# Patient Record
Sex: FEMALE | Race: BLACK OR AFRICAN AMERICAN | NOT HISPANIC OR LATINO | Employment: FULL TIME | URBAN - METROPOLITAN AREA
[De-identification: names, ages, dates, MRNs, and addresses within clinical notes are randomized per-mention and may not be internally consistent; named-entity substitution may affect disease eponyms.]

---

## 2017-06-21 ENCOUNTER — HOSPITAL ENCOUNTER (OUTPATIENT)
Dept: RADIOLOGY | Facility: OTHER | Age: 47
Discharge: HOME | End: 2017-06-21
Attending: SPECIALIST
Payer: COMMERCIAL

## 2017-06-21 VITALS — HEIGHT: 64 IN | WEIGHT: 160 LBS | BODY MASS INDEX: 27.31 KG/M2

## 2017-06-21 DIAGNOSIS — Z12.31 VISIT FOR SCREENING MAMMOGRAM: ICD-10-CM

## 2017-06-21 PROCEDURE — 77067 SCR MAMMO BI INCL CAD: CPT | Mod: TC

## 2017-06-21 PROCEDURE — 77063 BREAST TOMOSYNTHESIS BI: CPT | Mod: 26,,, | Performed by: RADIOLOGY

## 2017-06-21 PROCEDURE — 77067 SCR MAMMO BI INCL CAD: CPT | Mod: 26,,, | Performed by: RADIOLOGY

## 2017-06-26 ENCOUNTER — OFFICE VISIT (OUTPATIENT)
Dept: INTERNAL MEDICINE | Facility: CLINIC | Age: 47
End: 2017-06-26
Payer: COMMERCIAL

## 2017-06-26 ENCOUNTER — LAB VISIT (OUTPATIENT)
Dept: LAB | Facility: OTHER | Age: 47
End: 2017-06-26
Attending: INTERNAL MEDICINE
Payer: COMMERCIAL

## 2017-06-26 VITALS
HEART RATE: 98 BPM | BODY MASS INDEX: 27.82 KG/M2 | SYSTOLIC BLOOD PRESSURE: 110 MMHG | HEIGHT: 64 IN | DIASTOLIC BLOOD PRESSURE: 80 MMHG | WEIGHT: 162.94 LBS

## 2017-06-26 DIAGNOSIS — Z00.00 WELLNESS EXAMINATION: Primary | ICD-10-CM

## 2017-06-26 DIAGNOSIS — Z00.00 WELLNESS EXAMINATION: ICD-10-CM

## 2017-06-26 DIAGNOSIS — F34.1 DYSTHYMIA: ICD-10-CM

## 2017-06-26 LAB
ALBUMIN SERPL BCP-MCNC: 3.9 G/DL
ALP SERPL-CCNC: 55 U/L
ALT SERPL W/O P-5'-P-CCNC: 12 U/L
ANION GAP SERPL CALC-SCNC: 10 MMOL/L
AST SERPL-CCNC: 14 U/L
BASOPHILS # BLD AUTO: 0.06 K/UL
BASOPHILS NFR BLD: 0.9 %
BILIRUB SERPL-MCNC: 0.4 MG/DL
BUN SERPL-MCNC: 10 MG/DL
CALCIUM SERPL-MCNC: 9 MG/DL
CHLORIDE SERPL-SCNC: 105 MMOL/L
CHOLEST/HDLC SERPL: 3.3 {RATIO}
CO2 SERPL-SCNC: 24 MMOL/L
CREAT SERPL-MCNC: 0.7 MG/DL
DIFFERENTIAL METHOD: ABNORMAL
EOSINOPHIL # BLD AUTO: 0.3 K/UL
EOSINOPHIL NFR BLD: 4.2 %
ERYTHROCYTE [DISTWIDTH] IN BLOOD BY AUTOMATED COUNT: 12.4 %
EST. GFR  (AFRICAN AMERICAN): >60 ML/MIN/1.73 M^2
EST. GFR  (NON AFRICAN AMERICAN): >60 ML/MIN/1.73 M^2
GLUCOSE SERPL-MCNC: 76 MG/DL
HCT VFR BLD AUTO: 34.7 %
HDL/CHOLESTEROL RATIO: 30.3 %
HDLC SERPL-MCNC: 155 MG/DL
HDLC SERPL-MCNC: 47 MG/DL
HGB BLD-MCNC: 11.5 G/DL
LDLC SERPL CALC-MCNC: 93.8 MG/DL
LYMPHOCYTES # BLD AUTO: 1.8 K/UL
LYMPHOCYTES NFR BLD: 25.8 %
MCH RBC QN AUTO: 31.2 PG
MCHC RBC AUTO-ENTMCNC: 33.1 %
MCV RBC AUTO: 94 FL
MONOCYTES # BLD AUTO: 0.6 K/UL
MONOCYTES NFR BLD: 8.5 %
NEUTROPHILS # BLD AUTO: 4.2 K/UL
NEUTROPHILS NFR BLD: 60.3 %
NONHDLC SERPL-MCNC: 108 MG/DL
PLATELET # BLD AUTO: 253 K/UL
PMV BLD AUTO: 10.2 FL
POTASSIUM SERPL-SCNC: 3.5 MMOL/L
PROT SERPL-MCNC: 7.6 G/DL
RBC # BLD AUTO: 3.69 M/UL
SODIUM SERPL-SCNC: 139 MMOL/L
T4 FREE SERPL-MCNC: 0.94 NG/DL
TRIGL SERPL-MCNC: 71 MG/DL
TSH SERPL DL<=0.005 MIU/L-ACNC: 0.29 UIU/ML
WBC # BLD AUTO: 6.98 K/UL

## 2017-06-26 PROCEDURE — 80061 LIPID PANEL: CPT

## 2017-06-26 PROCEDURE — 84443 ASSAY THYROID STIM HORMONE: CPT

## 2017-06-26 PROCEDURE — 99396 PREV VISIT EST AGE 40-64: CPT | Mod: S$GLB,,, | Performed by: INTERNAL MEDICINE

## 2017-06-26 PROCEDURE — 36415 COLL VENOUS BLD VENIPUNCTURE: CPT

## 2017-06-26 PROCEDURE — 99999 PR PBB SHADOW E&M-EST. PATIENT-LVL III: CPT | Mod: PBBFAC,,, | Performed by: INTERNAL MEDICINE

## 2017-06-26 PROCEDURE — 84439 ASSAY OF FREE THYROXINE: CPT

## 2017-06-26 PROCEDURE — 83036 HEMOGLOBIN GLYCOSYLATED A1C: CPT

## 2017-06-26 PROCEDURE — 80053 COMPREHEN METABOLIC PANEL: CPT

## 2017-06-26 PROCEDURE — 85025 COMPLETE CBC W/AUTO DIFF WBC: CPT

## 2017-06-26 RX ORDER — FLUTICASONE PROPIONATE 50 MCG
SPRAY, SUSPENSION (ML) NASAL
Qty: 16 G | Refills: 3 | Status: SHIPPED | OUTPATIENT
Start: 2017-06-26 | End: 2021-08-23 | Stop reason: ALTCHOICE

## 2017-06-26 RX ORDER — METRONIDAZOLE 500 MG/1
TABLET ORAL
COMMUNITY
Start: 2017-06-21 | End: 2017-06-26

## 2017-06-26 RX ORDER — OXYBUTYNIN CHLORIDE 10 MG/1
10 TABLET, EXTENDED RELEASE ORAL DAILY
Qty: 30 TABLET | Refills: 3
Start: 2017-06-26 | End: 2018-08-20

## 2017-06-26 RX ORDER — AMOXICILLIN AND CLAVULANATE POTASSIUM 875; 125 MG/1; MG/1
TABLET, FILM COATED ORAL
COMMUNITY
Start: 2017-06-21 | End: 2017-06-26

## 2017-06-26 RX ORDER — OXYBUTYNIN CHLORIDE 10 MG/1
TABLET, EXTENDED RELEASE ORAL
COMMUNITY
Start: 2017-06-21 | End: 2017-06-26

## 2017-06-26 NOTE — PROGRESS NOTES
Subjective:       Patient ID: Eva Hair is a 47 y.o. female.    Chief Complaint: Annual Exam and Blood Sugar Problem (family hx)    Pt here for annual exam. Feels well currently and has no c/o except that she'd like to see a counselor. Not depressed or anxious but wants to talk to someone about some life events. No SI/HI.     Counseled on exercise goals. She is up to date WWE with GYN. Mammogram is up to date.       Review of Systems   Constitutional: Negative for unexpected weight change.   Eyes: Negative for visual disturbance.   Respiratory: Negative for shortness of breath.    Cardiovascular: Negative for palpitations and leg swelling.   Gastrointestinal: Negative for blood in stool, constipation and diarrhea.   Genitourinary: Negative for dysuria.   Neurological: Negative for dizziness and syncope.   Hematological: Negative for adenopathy.   Psychiatric/Behavioral: Negative for dysphoric mood.       Objective:      Physical Exam   Constitutional: She is oriented to person, place, and time. She appears well-developed and well-nourished.   HENT:   Head: Normocephalic.   Right Ear: Tympanic membrane, external ear and ear canal normal.   Left Ear: Tympanic membrane, external ear and ear canal normal.   Nose: Nose normal.   Mouth/Throat: Uvula is midline and oropharynx is clear and moist.   Eyes: Conjunctivae, EOM and lids are normal. Pupils are equal, round, and reactive to light. Right conjunctiva is not injected. Left conjunctiva is not injected.   Neck: Neck supple. No JVD present. Carotid bruit is not present. No thyroid mass and no thyromegaly present.   Cardiovascular: Normal rate, regular rhythm, S1 normal, S2 normal and intact distal pulses.  PMI is not displaced.    No murmur heard.  Pulses:       Posterior tibial pulses are 2+ on the right side, and 2+ on the left side.   Pulmonary/Chest: Effort normal and breath sounds normal. She has no wheezes. She has no rhonchi. She has no rales.   Abdominal:  Soft. Bowel sounds are normal. She exhibits no distension and no abdominal bruit. There is no hepatosplenomegaly. There is no tenderness.   Musculoskeletal: She exhibits no edema.   Lymphadenopathy:        Head (right side): No preauricular and no posterior auricular adenopathy present.        Head (left side): No preauricular and no posterior auricular adenopathy present.     She has no cervical adenopathy.     She has no axillary adenopathy.   Neurological: She is alert and oriented to person, place, and time. She has normal strength. No cranial nerve deficit or sensory deficit. Gait normal.   Reflex Scores:       Patellar reflexes are 2+ on the right side and 2+ on the left side.       Achilles reflexes are 2+ on the right side and 2+ on the left side.  Skin: Skin is warm. No rash noted.   Psychiatric: She has a normal mood and affect. Her speech is normal and behavior is normal. Judgment and thought content normal.       Assessment:       1. Wellness examination    2. Dysthymia        Plan:       1. Appropriate labs  2. Psychology referral

## 2017-06-27 LAB
ESTIMATED AVG GLUCOSE: 100 MG/DL
HBA1C MFR BLD HPLC: 5.1 %

## 2018-07-11 ENCOUNTER — HOSPITAL ENCOUNTER (OUTPATIENT)
Dept: RADIOLOGY | Facility: OTHER | Age: 48
Discharge: HOME | End: 2018-07-11
Attending: SPECIALIST
Payer: COMMERCIAL

## 2018-07-11 VITALS — BODY MASS INDEX: 27.66 KG/M2 | WEIGHT: 162 LBS | HEIGHT: 64 IN

## 2018-07-11 DIAGNOSIS — Z12.31 VISIT FOR SCREENING MAMMOGRAM: ICD-10-CM

## 2018-07-11 PROCEDURE — 77063 BREAST TOMOSYNTHESIS BI: CPT | Mod: 26,,, | Performed by: INTERNAL MEDICINE

## 2018-07-11 PROCEDURE — 77067 SCR MAMMO BI INCL CAD: CPT | Mod: TC

## 2018-07-11 PROCEDURE — 77067 SCR MAMMO BI INCL CAD: CPT | Mod: 26,,, | Performed by: INTERNAL MEDICINE

## 2018-08-20 ENCOUNTER — LAB VISIT (OUTPATIENT)
Dept: LAB | Facility: HOSPITAL | Age: 48
End: 2018-08-20
Attending: INTERNAL MEDICINE
Payer: COMMERCIAL

## 2018-08-20 ENCOUNTER — OFFICE VISIT (OUTPATIENT)
Dept: FAMILY MEDICINE | Facility: CLINIC | Age: 48
End: 2018-08-20
Payer: COMMERCIAL

## 2018-08-20 VITALS
HEIGHT: 65 IN | TEMPERATURE: 98 F | BODY MASS INDEX: 27.44 KG/M2 | HEART RATE: 95 BPM | DIASTOLIC BLOOD PRESSURE: 82 MMHG | OXYGEN SATURATION: 97 % | SYSTOLIC BLOOD PRESSURE: 122 MMHG | WEIGHT: 164.69 LBS

## 2018-08-20 DIAGNOSIS — Z00.00 ANNUAL PHYSICAL EXAM: ICD-10-CM

## 2018-08-20 DIAGNOSIS — L30.9 ECZEMA, UNSPECIFIED TYPE: ICD-10-CM

## 2018-08-20 DIAGNOSIS — F32.A DEPRESSION, UNSPECIFIED DEPRESSION TYPE: ICD-10-CM

## 2018-08-20 DIAGNOSIS — J45.30 MILD PERSISTENT ASTHMA WITHOUT COMPLICATION: ICD-10-CM

## 2018-08-20 DIAGNOSIS — R32 URINARY INCONTINENCE, UNSPECIFIED TYPE: ICD-10-CM

## 2018-08-20 DIAGNOSIS — Z00.00 ANNUAL PHYSICAL EXAM: Primary | ICD-10-CM

## 2018-08-20 DIAGNOSIS — J30.89 ENVIRONMENTAL AND SEASONAL ALLERGIES: ICD-10-CM

## 2018-08-20 LAB
ANION GAP SERPL CALC-SCNC: 11 MMOL/L
BASOPHILS # BLD AUTO: 0.11 K/UL
BASOPHILS NFR BLD: 1.7 %
BUN SERPL-MCNC: 17 MG/DL
CALCIUM SERPL-MCNC: 9.6 MG/DL
CHLORIDE SERPL-SCNC: 106 MMOL/L
CHOLEST SERPL-MCNC: 161 MG/DL
CHOLEST/HDLC SERPL: 3 {RATIO}
CO2 SERPL-SCNC: 21 MMOL/L
CREAT SERPL-MCNC: 0.8 MG/DL
DIFFERENTIAL METHOD: ABNORMAL
EOSINOPHIL # BLD AUTO: 0.4 K/UL
EOSINOPHIL NFR BLD: 5.9 %
ERYTHROCYTE [DISTWIDTH] IN BLOOD BY AUTOMATED COUNT: 12.7 %
EST. GFR  (AFRICAN AMERICAN): >60 ML/MIN/1.73 M^2
EST. GFR  (NON AFRICAN AMERICAN): >60 ML/MIN/1.73 M^2
ESTIMATED AVG GLUCOSE: 103 MG/DL
GLUCOSE SERPL-MCNC: 89 MG/DL
HBA1C MFR BLD HPLC: 5.2 %
HCT VFR BLD AUTO: 36.5 %
HDLC SERPL-MCNC: 53 MG/DL
HDLC SERPL: 32.9 %
HGB BLD-MCNC: 11.7 G/DL
IMM GRANULOCYTES # BLD AUTO: 0.02 K/UL
IMM GRANULOCYTES NFR BLD AUTO: 0.3 %
LDLC SERPL CALC-MCNC: 96.6 MG/DL
LYMPHOCYTES # BLD AUTO: 1.7 K/UL
LYMPHOCYTES NFR BLD: 25.9 %
MCH RBC QN AUTO: 31.3 PG
MCHC RBC AUTO-ENTMCNC: 32.1 G/DL
MCV RBC AUTO: 98 FL
MONOCYTES # BLD AUTO: 0.6 K/UL
MONOCYTES NFR BLD: 9.1 %
NEUTROPHILS # BLD AUTO: 3.7 K/UL
NEUTROPHILS NFR BLD: 57.1 %
NONHDLC SERPL-MCNC: 108 MG/DL
NRBC BLD-RTO: 0 /100 WBC
PLATELET # BLD AUTO: 240 K/UL
PMV BLD AUTO: 10.2 FL
POTASSIUM SERPL-SCNC: 4.6 MMOL/L
RBC # BLD AUTO: 3.74 M/UL
SODIUM SERPL-SCNC: 138 MMOL/L
TRIGL SERPL-MCNC: 57 MG/DL
TSH SERPL DL<=0.005 MIU/L-ACNC: 0.5 UIU/ML
WBC # BLD AUTO: 6.48 K/UL

## 2018-08-20 PROCEDURE — 80061 LIPID PANEL: CPT

## 2018-08-20 PROCEDURE — 85025 COMPLETE CBC W/AUTO DIFF WBC: CPT

## 2018-08-20 PROCEDURE — 36415 COLL VENOUS BLD VENIPUNCTURE: CPT | Mod: PO

## 2018-08-20 PROCEDURE — 99999 PR PBB SHADOW E&M-EST. PATIENT-LVL III: CPT | Mod: PBBFAC,,, | Performed by: INTERNAL MEDICINE

## 2018-08-20 PROCEDURE — 84443 ASSAY THYROID STIM HORMONE: CPT

## 2018-08-20 PROCEDURE — 80048 BASIC METABOLIC PNL TOTAL CA: CPT

## 2018-08-20 PROCEDURE — 99396 PREV VISIT EST AGE 40-64: CPT | Mod: S$GLB,,, | Performed by: INTERNAL MEDICINE

## 2018-08-20 PROCEDURE — 83036 HEMOGLOBIN GLYCOSYLATED A1C: CPT

## 2018-08-20 RX ORDER — LORATADINE 10 MG/1
10 TABLET ORAL DAILY
Refills: 4 | COMMUNITY
Start: 2018-06-05 | End: 2021-08-23 | Stop reason: ALTCHOICE

## 2018-08-20 RX ORDER — OXYBUTYNIN CHLORIDE 5 MG/1
5 TABLET, EXTENDED RELEASE ORAL DAILY
Qty: 90 TABLET | Refills: 3 | Status: SHIPPED | OUTPATIENT
Start: 2018-08-20 | End: 2023-07-31

## 2018-08-20 RX ORDER — OLOPATADINE HYDROCHLORIDE 1 MG/ML
SOLUTION/ DROPS OPHTHALMIC
Refills: 4 | COMMUNITY
Start: 2018-08-15

## 2018-08-20 RX ORDER — BUDESONIDE AND FORMOTEROL FUMARATE DIHYDRATE 160; 4.5 UG/1; UG/1
2 AEROSOL RESPIRATORY (INHALATION) DAILY
COMMUNITY
End: 2018-12-13 | Stop reason: SDUPTHER

## 2018-08-20 RX ORDER — MINERAL OIL
180 ENEMA (ML) RECTAL EVERY MORNING
Refills: 4 | COMMUNITY
Start: 2018-08-04 | End: 2020-07-13

## 2018-08-20 RX ORDER — AZELASTINE 1 MG/ML
1 SPRAY, METERED NASAL DAILY PRN
Refills: 4 | COMMUNITY
Start: 2018-08-15

## 2018-08-20 RX ORDER — ESCITALOPRAM OXALATE 5 MG/1
TABLET ORAL
Qty: 30 TABLET | Refills: 1 | Status: SHIPPED | OUTPATIENT
Start: 2018-08-20 | End: 2018-10-12 | Stop reason: SDUPTHER

## 2018-08-20 RX ORDER — ALBUTEROL SULFATE 90 UG/1
2 AEROSOL, METERED RESPIRATORY (INHALATION) DAILY
COMMUNITY

## 2018-08-20 NOTE — PROGRESS NOTES
"Subjective:        Patient ID: Eva Hair is a 48 y.o. female.    Chief Complaint: Annual Exam and Establish Care    HPI   Eva Hair presents for annual exam and to establish care.    Pt expresses concern that she is depressed.  She has been feeling this way for the past 1 year and it's gradually getting worse.  She c/o decreased interest, feeling irritable, depressed mood, difficulty sleeping.  She thinks it may be due to poor work/personal life balance.  It is getting to the point where it's interfering with her function and she's concerned it affecting the way people may perceive or interact with her, especially at work.    Pt was prescribed Lexapro in the past, ~[id number].  She took the medication for about 6 months.  She is concerned that she will become dependent on medication if she takes something for the depression.  She is also worried that she often has the SE of being drowsy from medication.    Review of Systems   Constitutional: Positive for fatigue and unexpected weight change.   HENT: Negative for hearing loss.         - itchy ears 2/2 allergies   Respiratory: Negative for shortness of breath.         - "heavy" breathing   Cardiovascular: Negative for chest pain and leg swelling.   Gastrointestinal: Negative for abdominal pain, constipation and diarrhea.   Genitourinary: Negative for difficulty urinating, vaginal bleeding and vaginal discharge.        - urinary incontinence   Musculoskeletal: Positive for arthralgias. Negative for back pain.   Allergic/Immunologic: Positive for environmental allergies.   Neurological: Negative for dizziness and headaches.   Hematological: Does not bruise/bleed easily.   Psychiatric/Behavioral: Positive for dysphoric mood and sleep disturbance. The patient is not nervous/anxious.            Objective:        Vitals:    08/20/18 0750   BP: 122/82   Pulse: 95   Temp: 98.3 °F (36.8 °C)     Physical Exam   Constitutional: She is oriented to person, place, and " time. She appears well-developed and well-nourished. She appears distressed (tearful).   HENT:   Head: Normocephalic and atraumatic.   Right Ear: External ear normal.   Left Ear: External ear normal.   Nose: Nose normal.   - bilateral ear canals clear, tympanic membranes visualized - normal color and light reflex   Eyes: Conjunctivae and EOM are normal.   Neck: Normal range of motion. Neck supple. No thyromegaly present.   Cardiovascular: Normal rate, regular rhythm and normal heart sounds.   No murmur heard.  Pulmonary/Chest: Effort normal and breath sounds normal. No respiratory distress.   Abdominal: Soft. She exhibits no distension and no mass. There is no tenderness. There is no guarding.   Musculoskeletal: She exhibits no edema.   Lymphadenopathy:     She has no cervical adenopathy.   Neurological: She is alert and oriented to person, place, and time.   Skin: Skin is warm and dry.   Psychiatric:   - affect appropriate   Vitals reviewed.          Assessment:         1. Annual physical exam    2. Mild persistent asthma without complication    3. Environmental and seasonal allergies    4. Eczema, unspecified type    5. Depression, unspecified depression type    6. Urinary incontinence, unspecified type              Plan:         Eva was seen today for annual exam and establish care.    Diagnoses and all orders for this visit:    Annual physical exam  - fasting labs today  - f/u gyn Dr. Rowland  -     CBC auto differential; Future  -     Basic metabolic panel; Future  -     Lipid panel; Future  -     Hemoglobin A1c; Future  -     TSH; Future    Mild persistent asthma without complication: no acute issues; Symbicort, Albuterol PRN    Environmental and seasonal allergies: Allegra, nasal sprays, Patanol gtt to ears as needed for itching    Eczema, unspecified type: Hydrocortisone cream PRN    Depression, unspecified depression type: Discussed medication options, possible SEs.  Recommend starting because sx are  affecting QoL and function and are getting worse.  Start Lexapro 2.5mg qhs; after 1 week increase to 5mg daily if tolerated.  Counseled pt this is a very low lose, need at least 1 month to see any effects, may need higher dose to see therapeutic effects.  -     escitalopram oxalate (LEXAPRO) 5 MG Tab; Take 1/2 tab by mouth daily for 1 week then increase to 1 tab daily.    Urinary incontinence, unspecified type: Decrease oxybutynin dose to 5mg qd.  Pt reports HAs when taking 10mg qd.  -     oxybutynin (DITROPAN-XL) 5 MG TR24; Take 1 tablet (5 mg total) by mouth once daily.        Follow-up in about 1 month (around 9/20/2018) for depressed mood.

## 2018-08-28 ENCOUNTER — TELEPHONE (OUTPATIENT)
Dept: FAMILY MEDICINE | Facility: CLINIC | Age: 48
End: 2018-08-28

## 2018-08-28 NOTE — TELEPHONE ENCOUNTER
Notified patient that there is an unread message in [facility] from Dr. Trujillo. Read message. Patient verbalizes understanding.She will go to [facility] to ask any questions.

## 2018-10-12 DIAGNOSIS — F32.A DEPRESSION, UNSPECIFIED DEPRESSION TYPE: ICD-10-CM

## 2018-10-12 RX ORDER — ESCITALOPRAM OXALATE 5 MG/1
TABLET ORAL
Qty: 30 TABLET | Refills: 1 | Status: SHIPPED | OUTPATIENT
Start: 2018-10-12 | End: 2018-12-10 | Stop reason: SDUPTHER

## 2018-12-04 DIAGNOSIS — F32.A DEPRESSION, UNSPECIFIED DEPRESSION TYPE: ICD-10-CM

## 2018-12-04 RX ORDER — ESCITALOPRAM OXALATE 5 MG/1
TABLET ORAL
Qty: 30 TABLET | Refills: 0 | Status: CANCELLED | OUTPATIENT
Start: 2018-12-04

## 2018-12-10 DIAGNOSIS — F32.A DEPRESSION, UNSPECIFIED DEPRESSION TYPE: ICD-10-CM

## 2018-12-10 RX ORDER — ESCITALOPRAM OXALATE 5 MG/1
5 TABLET ORAL DAILY
Qty: 30 TABLET | Refills: 1 | Status: SHIPPED | OUTPATIENT
Start: 2018-12-10 | End: 2018-12-13 | Stop reason: SINTOL

## 2018-12-10 NOTE — TELEPHONE ENCOUNTER
Called patient in regards to unread [facility] message. Read her the message and she states that she is taking the whole lexapro 5mg and would like that refilled. She also requested an appointment with Dr. Trujillo. Appointment scheduled.

## 2018-12-13 ENCOUNTER — OFFICE VISIT (OUTPATIENT)
Dept: FAMILY MEDICINE | Facility: CLINIC | Age: 48
End: 2018-12-13
Payer: COMMERCIAL

## 2018-12-13 VITALS
DIASTOLIC BLOOD PRESSURE: 100 MMHG | WEIGHT: 165.56 LBS | HEIGHT: 64 IN | RESPIRATION RATE: 20 BRPM | SYSTOLIC BLOOD PRESSURE: 120 MMHG | TEMPERATURE: 98 F | BODY MASS INDEX: 28.27 KG/M2

## 2018-12-13 DIAGNOSIS — J30.89 ENVIRONMENTAL AND SEASONAL ALLERGIES: ICD-10-CM

## 2018-12-13 DIAGNOSIS — F32.A DEPRESSION, UNSPECIFIED DEPRESSION TYPE: Primary | ICD-10-CM

## 2018-12-13 DIAGNOSIS — J45.30 MILD PERSISTENT ASTHMA WITHOUT COMPLICATION: ICD-10-CM

## 2018-12-13 PROCEDURE — 99214 OFFICE O/P EST MOD 30 MIN: CPT | Mod: S$GLB,,, | Performed by: INTERNAL MEDICINE

## 2018-12-13 PROCEDURE — 3008F BODY MASS INDEX DOCD: CPT | Mod: CPTII,S$GLB,, | Performed by: INTERNAL MEDICINE

## 2018-12-13 PROCEDURE — 99999 PR PBB SHADOW E&M-EST. PATIENT-LVL III: CPT | Mod: PBBFAC,,, | Performed by: INTERNAL MEDICINE

## 2018-12-13 RX ORDER — BUDESONIDE AND FORMOTEROL FUMARATE DIHYDRATE 160; 4.5 UG/1; UG/1
2 AEROSOL RESPIRATORY (INHALATION) DAILY
Qty: 10.2 G | Refills: 5 | Status: SHIPPED | OUTPATIENT
Start: 2018-12-13 | End: 2019-12-13

## 2018-12-13 RX ORDER — MONTELUKAST SODIUM 10 MG/1
10 TABLET ORAL DAILY
Refills: 4 | COMMUNITY
Start: 2018-11-08 | End: 2023-07-21

## 2018-12-13 RX ORDER — BUPROPION HYDROCHLORIDE 150 MG/1
150 TABLET ORAL DAILY
Qty: 30 TABLET | Refills: 2 | Status: SHIPPED | OUTPATIENT
Start: 2018-12-13 | End: 2019-03-05 | Stop reason: SDUPTHER

## 2018-12-13 NOTE — PROGRESS NOTES
"Subjective:        Patient ID: Eva Hair is a 48 y.o. female.    Chief Complaint: Anxiety; Hallucinations; Stress (work related); and Insomnia    HPI   Eva Hair presents with c/o hallucinations.  Pt reports "thoughts" at night while she is sleeping.  She denies seeing or hearing things that are not really present while she is awake.  She is concerned this is a SE from Lexapro.  It is interrupting her sleep and she gets up in the AM really tired and not rested.    She endorses a lot of stress at work so she is often thinking about work at night and at home.  She leaves her work phone in her desk at work when she leaves but her colleagues have her personal phone # and they do contact her after work hours.    Pt has been taking Lexapro 2.5mg qd.  Aside from the above, it has helped her depressed mood.    Pt is also seeking [facility] paperwork completed so that when she is not feeling well or too tired to focus at work, she has the flexibility to not go in that day.    Review of Systems  as per HPI      Objective:        Vitals:    12/13/18 1545   BP: (!) 120/100   Resp: 20   Temp: 98.2 °F (36.8 °C)     Physical Exam   Constitutional: She is oriented to person, place, and time. She appears well-developed and well-nourished. No distress.   congested   HENT:   Head: Normocephalic and atraumatic.   Right Ear: External ear normal.   Left Ear: External ear normal.   Nose: Nose normal.   Eyes: Conjunctivae and EOM are normal.   Clear watery discharge b/l   Neurological: She is alert and oriented to person, place, and time.   Skin: Skin is warm and dry.   Psychiatric: Her behavior is normal.   Vitals reviewed.          Assessment:         1. Depression, unspecified depression type    2. Mild persistent asthma without complication    3. Environmental and seasonal allergies              Plan:         Eva was seen today for anxiety, hallucinations, stress and insomnia.    Diagnoses and all orders for this " visit:    Depression, unspecified depression type: Pt is not having hallucinations but vivid dreams/thoughts, mostly about work, at night when sleeping.  Will switch Lexapro to Wellbutrin.  I do not think [facility] paperwork for this condition is appropriate at this time.  Pt agreeable to establishing care with psychiatry for further management.  We discussed leaving work at work, relaxing activities to clear mind before bedtime like deep breathing and guided meditation.  -     Ambulatory referral to Psychiatry  -     buPROPion (WELLBUTRIN XL) 150 MG TB24 tablet; Take 1 tablet (150 mg total) by mouth once daily.    Mild persistent asthma without complication: No acute issues; refill Symbicort.  -     budesonide-formoterol 160-4.5 mcg (SYMBICORT) 160-4.5 mcg/actuation HFAA; Inhale 2 puffs into the lungs once daily. Controller    Environmental and seasonal allergies: Singulair, Loratadine, Flonase

## 2019-03-05 DIAGNOSIS — F32.A DEPRESSION, UNSPECIFIED DEPRESSION TYPE: ICD-10-CM

## 2019-03-06 RX ORDER — BUPROPION HYDROCHLORIDE 150 MG/1
TABLET ORAL
Qty: 30 TABLET | Refills: 5 | Status: SHIPPED | OUTPATIENT
Start: 2019-03-06 | End: 2020-07-13

## 2019-07-15 ENCOUNTER — HOSPITAL ENCOUNTER (OUTPATIENT)
Dept: RADIOLOGY | Facility: HOSPITAL | Age: 49
Discharge: HOME | End: 2019-07-15
Attending: SPECIALIST
Payer: COMMERCIAL

## 2019-07-15 VITALS — WEIGHT: 165 LBS | BODY MASS INDEX: 28.32 KG/M2

## 2019-07-15 DIAGNOSIS — Z12.31 VISIT FOR SCREENING MAMMOGRAM: ICD-10-CM

## 2019-07-15 PROCEDURE — 77063 MAMMO DIGITAL SCREENING BILAT WITH TOMOSYNTHESIS_CAD: ICD-10-PCS | Mod: 26,,, | Performed by: RADIOLOGY

## 2019-07-15 PROCEDURE — 77067 SCR MAMMO BI INCL CAD: CPT | Mod: TC,PN

## 2019-07-15 PROCEDURE — 77067 SCR MAMMO BI INCL CAD: CPT | Mod: 26,,, | Performed by: RADIOLOGY

## 2019-07-15 PROCEDURE — 77063 BREAST TOMOSYNTHESIS BI: CPT | Mod: 26,,, | Performed by: RADIOLOGY

## 2019-07-15 PROCEDURE — 77067 MAMMO DIGITAL SCREENING BILAT WITH TOMOSYNTHESIS_CAD: ICD-10-PCS | Mod: 26,,, | Performed by: RADIOLOGY

## 2020-07-13 ENCOUNTER — OFFICE VISIT (OUTPATIENT)
Dept: OBSTETRICS AND GYNECOLOGY | Facility: CLINIC | Age: 50
End: 2020-07-13
Attending: OBSTETRICS & GYNECOLOGY
Payer: COMMERCIAL

## 2020-07-13 VITALS
SYSTOLIC BLOOD PRESSURE: 114 MMHG | HEIGHT: 64 IN | DIASTOLIC BLOOD PRESSURE: 78 MMHG | WEIGHT: 166.88 LBS | BODY MASS INDEX: 28.49 KG/M2

## 2020-07-13 DIAGNOSIS — D48.5 MUIR-TORRE SYNDROME: ICD-10-CM

## 2020-07-13 DIAGNOSIS — Z01.419 WELL WOMAN EXAM WITH ROUTINE GYNECOLOGICAL EXAM: Primary | ICD-10-CM

## 2020-07-13 PROCEDURE — 99386 PR PREVENTIVE VISIT,NEW,40-64: ICD-10-PCS | Mod: S$GLB,,, | Performed by: STUDENT IN AN ORGANIZED HEALTH CARE EDUCATION/TRAINING PROGRAM

## 2020-07-13 PROCEDURE — 99999 PR PBB SHADOW E&M-EST. PATIENT-LVL III: CPT | Mod: PBBFAC,,, | Performed by: STUDENT IN AN ORGANIZED HEALTH CARE EDUCATION/TRAINING PROGRAM

## 2020-07-13 PROCEDURE — 99386 PREV VISIT NEW AGE 40-64: CPT | Mod: S$GLB,,, | Performed by: STUDENT IN AN ORGANIZED HEALTH CARE EDUCATION/TRAINING PROGRAM

## 2020-07-13 PROCEDURE — 99999 PR PBB SHADOW E&M-EST. PATIENT-LVL III: ICD-10-PCS | Mod: PBBFAC,,, | Performed by: STUDENT IN AN ORGANIZED HEALTH CARE EDUCATION/TRAINING PROGRAM

## 2020-07-13 RX ORDER — METRONIDAZOLE 500 MG/1
500 TABLET ORAL EVERY 12 HOURS
Qty: 14 TABLET | Refills: 0 | Status: SHIPPED | OUTPATIENT
Start: 2020-07-13 | End: 2020-07-20

## 2020-07-13 NOTE — PROGRESS NOTES
History & Physical  Gynecology      SUBJECTIVE:     Chief Complaint: Annual Exam       History of Present Illness:  Eva is here for annual exam. No complaints other than some leakage with cough laugh sneeze or when she waits too long to use the restroom.     Menstrual History:  S/p hyst in  for AUB -RICKIE through , ovaries in place. Reports bladder repair after hsyt  Obstetric Hx: CSx2  STD/STI Hx: Denies any history of STD's  Sexually Active: one male partner  Family history: below  Social: Wears seatbelts. Does not exercise. Feels safe at home.   Last pap: never abnormal  Last mammo: 2019  Colonoscopy: utd  Labs utd  Vitamins: intermittentaly        Review of patient's allergies indicates:   Allergen Reactions    Cat/feline products     Grass pollen-red top, standard     Iodine and iodide containing products Swelling    Milk containing products      Whole Milk - Headache    Oxycodone Other (See Comments)     Causes hallucinations    Pecan nut      Headache    Pork/porcine containing products Other (See Comments)     Headache       Past Medical History:   Diagnosis Date    Eczema 2018    Environmental and seasonal allergies 2018    Mild persistent asthma without complication 2018     Past Surgical History:   Procedure Laterality Date     SECTION      x 2    HEMORRHOID SURGERY      HYSTERECTOMY      ovaries intact     OB History        2    Para   2    Term   2            AB        Living           SAB        TAB        Ectopic        Multiple        Live Births                   Family History   Problem Relation Age of Onset    Arthritis Mother         gout    Hypertension Father     Cerebral aneurysm Father 72    Diabetes Paternal Aunt     Diabetes Maternal Grandmother     Diabetes Maternal Grandfather     Breast cancer Maternal Aunt         two aunts     Social History     Tobacco Use    Smoking status: Never Smoker    Smokeless tobacco:  Never Used   Substance Use Topics    Alcohol use: Yes     Comment: 2x/year    Drug use: No       Current Outpatient Medications   Medication Sig    albuterol (PROVENTIL HFA) 90 mcg/actuation inhaler Inhale 2 puffs into the lungs once daily. Rescue    azelastine (ASTELIN) 137 mcg (0.1 %) nasal spray 1 spray by Nasal route daily as needed.    fluticasone (FLONASE) 50 mcg/actuation nasal spray PLACE 2 SPRAYS INTO EACH NOSTRIL DAILY    loratadine (CLARITIN) 10 mg tablet Take 10 mg by mouth once daily.    montelukast (SINGULAIR) 10 mg tablet Take 10 mg by mouth once daily.    olopatadine (PATANOL) 0.1 % ophthalmic solution INSTILL 2 DROPS INTO BOTH EARS TWICE A DAY    budesonide-formoterol 160-4.5 mcg (SYMBICORT) 160-4.5 mcg/actuation HFAA Inhale 2 puffs into the lungs once daily. Controller    metroNIDAZOLE (FLAGYL) 500 MG tablet Take 1 tablet (500 mg total) by mouth every 12 (twelve) hours. for 7 days    oxybutynin (DITROPAN-XL) 5 MG TR24 Take 1 tablet (5 mg total) by mouth once daily.     No current facility-administered medications for this visit.          Review of Systems:  Review of Systems   Constitutional: Negative for activity change, appetite change, fever and unexpected weight change.   Eyes: Negative for visual disturbance.   Respiratory: Negative for shortness of breath.    Cardiovascular: Negative for chest pain.   Gastrointestinal: Negative for abdominal pain, blood in stool, constipation, diarrhea, nausea and vomiting.   Genitourinary: Positive for urinary incontinence. Negative for dyspareunia, dysuria, hematuria, pelvic pain, vaginal bleeding, vaginal discharge, vaginal pain, postcoital bleeding and vaginal odor.   Integumentary:  Negative for breast mass.   Neurological: Negative for headaches.   Breast: Negative for lump and mass       OBJECTIVE:     Physical Exam:  Physical Exam  Vitals signs and nursing note reviewed.   Constitutional:       General: She is not in acute distress.      Appearance: She is well-developed. She is not diaphoretic.   HENT:      Head: Normocephalic and atraumatic.   Eyes:      General: No scleral icterus.        Right eye: No discharge.         Left eye: No discharge.      Conjunctiva/sclera: Conjunctivae normal.   Neck:      Musculoskeletal: Normal range of motion and neck supple.      Thyroid: No thyromegaly.   Cardiovascular:      Rate and Rhythm: Normal rate.      Heart sounds: No murmur.   Pulmonary:      Effort: Pulmonary effort is normal.   Chest:      Breasts: Breasts are symmetrical.         Right: No inverted nipple, mass, nipple discharge, skin change or tenderness.         Left: No inverted nipple, mass, nipple discharge, skin change or tenderness.   Abdominal:      General: There is no distension.      Palpations: Abdomen is soft.      Tenderness: There is no abdominal tenderness.   Genitourinary:     Labia:         Right: No rash, tenderness, lesion or injury.         Left: No rash, tenderness, lesion or injury.       Vagina: No signs of injury and foreign body. No erythema, tenderness or bleeding.      Adnexa:         Right: No mass, tenderness or fullness.          Left: No mass, tenderness or fullness.        Comments: Uterus, cervix surgically absent  Musculoskeletal: Normal range of motion.   Lymphadenopathy:      Cervical: No cervical adenopathy.   Skin:     General: Skin is warm and dry.      Findings: No erythema or rash.   Neurological:      Mental Status: She is alert and oriented to person, place, and time.           ASSESSMENT:       ICD-10-CM ICD-9-CM    1. Well woman exam with routine gynecological exam  Z01.419 V72.31 Mammo Digital Screening Bilat w/ Nawaf          Plan:      WWE  - Vaccines utd  - Pap n/a  - Mammogram ordered  - GC/CT, affirm n/a  - Daily vitamin discussed.  - Consistent condom use discussed.   - CBE normal. Physical exam normal. VSS.  - RTC for annual or PRN.    JARETH  - Udip neg  --Empty bladder every 3 hours.  Empty well:  wait a minute, lean forward on toilet.    --Avoid dietary irritants (see sheet).  Keep diary x 3-5 days to determine your irritants.  --Nicolasa       Counseling time: 15 minutes    Ashly Pozo

## 2020-07-13 NOTE — PATIENT INSTRUCTIONS
Persons nine to 18 years of age: 1,300 mg of calcium, 600 IU of vitamin D  Persons 19 to 50 years of age: 1,000 mg of calcium, 600 IU of vitamin D  Persons 51 to 70 years of age: 1,200 mg of calcium, 600 IU of vitamin D  Persons 71 years and older: 1,200 mg of calcium, 800 IU of vitamin D        Kegel Exercises  Kegel exercises dont need special clothing or equipment. Theyre easy to learn and simple to do. And if you do them right, no one can tell youre doing them, so they can be done almost anywhere. Your healthcare provider, nurse, or physical therapist can answer any questions you have and help you get started.    A weak pelvic floor   The pelvic floor muscles may weaken due to aging, pregnancy and vaginal childbirth, injury, surgery, chronic cough, or lack of exercise. If the pelvic floor is weak, your bladder and other pelvic organs may sag out of place. The urethra may also open too easily and allow urine to leak out. Kegel exercises can help you strengthen your pelvic floor muscles. Then they can better support the pelvic organs and control urine flow.  How Kegel exercises are done  Try each of the Kegel exercises described below. When youre doing them, try not to move your leg, buttock, or stomach muscles.  · Contract as if you were stopping your urine stream. But do it when youre not urinating.  · Tighten your rectum as if trying not to pass gas. Contract your anus, but dont move your buttocks.  · You may place a finger or 2 in the vagina and squeeze your finger with your vagina to learn which muscles to tighten.  Try to hold each Kegel for a slow count to 5. You probably wont be able to hold them for that long at first. But keep practicing. It will get easier as your pelvic floor gets stronger. Eventually, special weights that you place in your vagina may be recommended to help make your Kegels even more effective. Visit your healthcare provider if you have difficulties doing Kegel  exercises.  Helpful hints  Here are some tips to follow:  · Do your Kegels as often as you can. The more you do them, the faster youll feel the results.  · Pick an activity you do often as a reminder. For instance, do your Kegels every time you sit down.  · Tighten your pelvic floor before you sneeze, get up from a chair, cough, laugh, or lift. This protects your pelvic floor from injury and can help prevent urine leakage.   Date Last Reviewed: 8/5/2015  © 1198-7195 [employer]. [address] [place] [id number]. All rights reserved. This information is not intended as a substitute for professional medical care. Always follow your healthcare professional's instructions.

## 2020-07-20 ENCOUNTER — HOSPITAL ENCOUNTER (OUTPATIENT)
Dept: RADIOLOGY | Facility: HOSPITAL | Age: 50
Discharge: HOME | End: 2020-07-20
Attending: STUDENT IN AN ORGANIZED HEALTH CARE EDUCATION/TRAINING PROGRAM
Payer: COMMERCIAL

## 2020-07-20 DIAGNOSIS — Z01.419 WELL WOMAN EXAM WITH ROUTINE GYNECOLOGICAL EXAM: ICD-10-CM

## 2020-07-20 PROCEDURE — 77067 MAMMO DIGITAL SCREENING BILAT WITH TOMOSYNTHESIS_CAD: ICD-10-PCS | Mod: 26,,, | Performed by: RADIOLOGY

## 2020-07-20 PROCEDURE — 77067 SCR MAMMO BI INCL CAD: CPT | Mod: 26,,, | Performed by: RADIOLOGY

## 2020-07-20 PROCEDURE — 77063 MAMMO DIGITAL SCREENING BILAT WITH TOMOSYNTHESIS_CAD: ICD-10-PCS | Mod: 26,,, | Performed by: RADIOLOGY

## 2020-07-20 PROCEDURE — 77067 SCR MAMMO BI INCL CAD: CPT | Mod: TC,PN

## 2020-07-20 PROCEDURE — 77063 BREAST TOMOSYNTHESIS BI: CPT | Mod: 26,,, | Performed by: RADIOLOGY

## 2021-04-28 ENCOUNTER — PATIENT MESSAGE (OUTPATIENT)
Dept: RESEARCH | Facility: HOSPITAL | Age: 51
End: 2021-04-28

## 2021-05-05 ENCOUNTER — OFFICE VISIT (OUTPATIENT)
Dept: OBSTETRICS AND GYNECOLOGY | Facility: CLINIC | Age: 51
End: 2021-05-05
Payer: COMMERCIAL

## 2021-05-05 VITALS
BODY MASS INDEX: 26.16 KG/M2 | HEIGHT: 64 IN | SYSTOLIC BLOOD PRESSURE: 106 MMHG | DIASTOLIC BLOOD PRESSURE: 76 MMHG | WEIGHT: 153.25 LBS

## 2021-05-05 DIAGNOSIS — R39.9 URINARY SYMPTOM OR SIGN: ICD-10-CM

## 2021-05-05 DIAGNOSIS — R32 URINARY INCONTINENCE, UNSPECIFIED TYPE: Primary | ICD-10-CM

## 2021-05-05 LAB
BILIRUB SERPL-MCNC: NEGATIVE MG/DL
BLOOD URINE, POC: NEGATIVE
CLARITY, POC UA: CLEAR
COLOR, POC UA: YELLOW
GLUCOSE UR QL STRIP: NORMAL
KETONES UR QL STRIP: NEGATIVE
LEUKOCYTE ESTERASE URINE, POC: NEGATIVE
NITRITE, POC UA: NEGATIVE
PH, POC UA: 6
PROTEIN, POC: NORMAL
SPECIFIC GRAVITY, POC UA: 1.01
UROBILINOGEN, POC UA: NORMAL

## 2021-05-05 PROCEDURE — 99999 PR PBB SHADOW E&M-EST. PATIENT-LVL III: CPT | Mod: PBBFAC,,, | Performed by: STUDENT IN AN ORGANIZED HEALTH CARE EDUCATION/TRAINING PROGRAM

## 2021-05-05 PROCEDURE — 87186 SC STD MICRODIL/AGAR DIL: CPT | Performed by: STUDENT IN AN ORGANIZED HEALTH CARE EDUCATION/TRAINING PROGRAM

## 2021-05-05 PROCEDURE — 81002 POCT URINE DIPSTICK WITHOUT MICROSCOPE: ICD-10-PCS | Mod: S$GLB,,, | Performed by: STUDENT IN AN ORGANIZED HEALTH CARE EDUCATION/TRAINING PROGRAM

## 2021-05-05 PROCEDURE — 87088 URINE BACTERIA CULTURE: CPT | Performed by: STUDENT IN AN ORGANIZED HEALTH CARE EDUCATION/TRAINING PROGRAM

## 2021-05-05 PROCEDURE — 3008F PR BODY MASS INDEX (BMI) DOCUMENTED: ICD-10-PCS | Mod: CPTII,S$GLB,, | Performed by: STUDENT IN AN ORGANIZED HEALTH CARE EDUCATION/TRAINING PROGRAM

## 2021-05-05 PROCEDURE — 3008F BODY MASS INDEX DOCD: CPT | Mod: CPTII,S$GLB,, | Performed by: STUDENT IN AN ORGANIZED HEALTH CARE EDUCATION/TRAINING PROGRAM

## 2021-05-05 PROCEDURE — 87086 URINE CULTURE/COLONY COUNT: CPT | Performed by: STUDENT IN AN ORGANIZED HEALTH CARE EDUCATION/TRAINING PROGRAM

## 2021-05-05 PROCEDURE — 1126F AMNT PAIN NOTED NONE PRSNT: CPT | Mod: S$GLB,,, | Performed by: STUDENT IN AN ORGANIZED HEALTH CARE EDUCATION/TRAINING PROGRAM

## 2021-05-05 PROCEDURE — 99999 PR PBB SHADOW E&M-EST. PATIENT-LVL III: ICD-10-PCS | Mod: PBBFAC,,, | Performed by: STUDENT IN AN ORGANIZED HEALTH CARE EDUCATION/TRAINING PROGRAM

## 2021-05-05 PROCEDURE — 99213 PR OFFICE/OUTPT VISIT, EST, LEVL III, 20-29 MIN: ICD-10-PCS | Mod: 25,S$GLB,, | Performed by: STUDENT IN AN ORGANIZED HEALTH CARE EDUCATION/TRAINING PROGRAM

## 2021-05-05 PROCEDURE — 87077 CULTURE AEROBIC IDENTIFY: CPT | Performed by: STUDENT IN AN ORGANIZED HEALTH CARE EDUCATION/TRAINING PROGRAM

## 2021-05-05 PROCEDURE — 1126F PR PAIN SEVERITY QUANTIFIED, NO PAIN PRESENT: ICD-10-PCS | Mod: S$GLB,,, | Performed by: STUDENT IN AN ORGANIZED HEALTH CARE EDUCATION/TRAINING PROGRAM

## 2021-05-05 PROCEDURE — 81002 URINALYSIS NONAUTO W/O SCOPE: CPT | Mod: S$GLB,,, | Performed by: STUDENT IN AN ORGANIZED HEALTH CARE EDUCATION/TRAINING PROGRAM

## 2021-05-05 PROCEDURE — 99213 OFFICE O/P EST LOW 20 MIN: CPT | Mod: 25,S$GLB,, | Performed by: STUDENT IN AN ORGANIZED HEALTH CARE EDUCATION/TRAINING PROGRAM

## 2021-05-05 RX ORDER — PIMECROLIMUS 10 MG/G
CREAM TOPICAL
COMMUNITY
Start: 2021-04-19

## 2021-05-05 RX ORDER — ESCITALOPRAM OXALATE 20 MG/1
TABLET ORAL
COMMUNITY
Start: 2021-04-19 | End: 2023-07-31

## 2021-05-05 RX ORDER — ESZOPICLONE 2 MG/1
TABLET, FILM COATED ORAL
COMMUNITY
Start: 2021-04-19

## 2021-05-05 RX ORDER — PREDNISOLONE ACETATE 10 MG/ML
SUSPENSION/ DROPS OPHTHALMIC
COMMUNITY
Start: 2021-02-05 | End: 2023-07-21

## 2021-05-06 ENCOUNTER — TELEPHONE (OUTPATIENT)
Dept: ADMINISTRATIVE | Facility: OTHER | Age: 51
End: 2021-05-06

## 2021-05-09 LAB — BACTERIA UR CULT: ABNORMAL

## 2021-05-10 RX ORDER — NITROFURANTOIN 25; 75 MG/1; MG/1
100 CAPSULE ORAL 2 TIMES DAILY
Qty: 14 CAPSULE | Refills: 0 | Status: SHIPPED | OUTPATIENT
Start: 2021-05-10 | End: 2021-05-17

## 2021-07-09 ENCOUNTER — TELEPHONE (OUTPATIENT)
Dept: OBSTETRICS AND GYNECOLOGY | Facility: CLINIC | Age: 51
End: 2021-07-09

## 2021-07-19 ENCOUNTER — OFFICE VISIT (OUTPATIENT)
Dept: OBSTETRICS AND GYNECOLOGY | Facility: CLINIC | Age: 51
End: 2021-07-19
Payer: COMMERCIAL

## 2021-07-19 ENCOUNTER — LAB VISIT (OUTPATIENT)
Dept: LAB | Facility: HOSPITAL | Age: 51
End: 2021-07-19
Attending: OBSTETRICS & GYNECOLOGY
Payer: COMMERCIAL

## 2021-07-19 VITALS
WEIGHT: 156.5 LBS | DIASTOLIC BLOOD PRESSURE: 88 MMHG | HEIGHT: 64 IN | BODY MASS INDEX: 26.72 KG/M2 | SYSTOLIC BLOOD PRESSURE: 120 MMHG

## 2021-07-19 DIAGNOSIS — Z12.11 SCREEN FOR COLON CANCER: ICD-10-CM

## 2021-07-19 DIAGNOSIS — Z12.31 SCREENING MAMMOGRAM, ENCOUNTER FOR: ICD-10-CM

## 2021-07-19 DIAGNOSIS — Z01.419 ENCOUNTER FOR WELL WOMAN EXAM WITH ROUTINE GYNECOLOGICAL EXAM: Primary | ICD-10-CM

## 2021-07-19 DIAGNOSIS — N39.3 STRESS INCONTINENCE IN FEMALE: ICD-10-CM

## 2021-07-19 DIAGNOSIS — Z00.00 HEALTHCARE MAINTENANCE: ICD-10-CM

## 2021-07-19 DIAGNOSIS — Z11.3 SCREEN FOR STD (SEXUALLY TRANSMITTED DISEASE): ICD-10-CM

## 2021-07-19 LAB
BASOPHILS # BLD AUTO: 0.04 K/UL (ref 0–0.2)
BASOPHILS NFR BLD: 0.9 % (ref 0–1.9)
DIFFERENTIAL METHOD: ABNORMAL
EOSINOPHIL # BLD AUTO: 0.5 K/UL (ref 0–0.5)
EOSINOPHIL NFR BLD: 10.2 % (ref 0–8)
ERYTHROCYTE [DISTWIDTH] IN BLOOD BY AUTOMATED COUNT: 12.9 % (ref 11.5–14.5)
HCT VFR BLD AUTO: 33.8 % (ref 37–48.5)
HGB BLD-MCNC: 10.7 G/DL (ref 12–16)
IMM GRANULOCYTES # BLD AUTO: 0.02 K/UL (ref 0–0.04)
IMM GRANULOCYTES NFR BLD AUTO: 0.4 % (ref 0–0.5)
LYMPHOCYTES # BLD AUTO: 1.2 K/UL (ref 1–4.8)
LYMPHOCYTES NFR BLD: 26.1 % (ref 18–48)
MCH RBC QN AUTO: 31 PG (ref 27–31)
MCHC RBC AUTO-ENTMCNC: 31.7 G/DL (ref 32–36)
MCV RBC AUTO: 98 FL (ref 82–98)
MONOCYTES # BLD AUTO: 0.5 K/UL (ref 0.3–1)
MONOCYTES NFR BLD: 11.1 % (ref 4–15)
NEUTROPHILS # BLD AUTO: 2.4 K/UL (ref 1.8–7.7)
NEUTROPHILS NFR BLD: 51.3 % (ref 38–73)
NRBC BLD-RTO: 0 /100 WBC
PLATELET # BLD AUTO: 210 K/UL (ref 150–450)
PMV BLD AUTO: 10.6 FL (ref 9.2–12.9)
RBC # BLD AUTO: 3.45 M/UL (ref 4–5.4)
WBC # BLD AUTO: 4.6 K/UL (ref 3.9–12.7)

## 2021-07-19 PROCEDURE — 84443 ASSAY THYROID STIM HORMONE: CPT | Performed by: OBSTETRICS & GYNECOLOGY

## 2021-07-19 PROCEDURE — 83036 HEMOGLOBIN GLYCOSYLATED A1C: CPT | Performed by: OBSTETRICS & GYNECOLOGY

## 2021-07-19 PROCEDURE — 3008F BODY MASS INDEX DOCD: CPT | Mod: CPTII,S$GLB,, | Performed by: OBSTETRICS & GYNECOLOGY

## 2021-07-19 PROCEDURE — 87491 CHLMYD TRACH DNA AMP PROBE: CPT | Mod: 59 | Performed by: OBSTETRICS & GYNECOLOGY

## 2021-07-19 PROCEDURE — 87591 N.GONORRHOEAE DNA AMP PROB: CPT | Performed by: OBSTETRICS & GYNECOLOGY

## 2021-07-19 PROCEDURE — 87389 HIV-1 AG W/HIV-1&-2 AB AG IA: CPT | Performed by: OBSTETRICS & GYNECOLOGY

## 2021-07-19 PROCEDURE — 84439 ASSAY OF FREE THYROXINE: CPT | Performed by: OBSTETRICS & GYNECOLOGY

## 2021-07-19 PROCEDURE — 36415 COLL VENOUS BLD VENIPUNCTURE: CPT | Mod: PN | Performed by: OBSTETRICS & GYNECOLOGY

## 2021-07-19 PROCEDURE — 1126F PR PAIN SEVERITY QUANTIFIED, NO PAIN PRESENT: ICD-10-PCS | Mod: CPTII,S$GLB,, | Performed by: OBSTETRICS & GYNECOLOGY

## 2021-07-19 PROCEDURE — 80061 LIPID PANEL: CPT | Performed by: OBSTETRICS & GYNECOLOGY

## 2021-07-19 PROCEDURE — 99999 PR PBB SHADOW E&M-EST. PATIENT-LVL IV: ICD-10-PCS | Mod: PBBFAC,,, | Performed by: OBSTETRICS & GYNECOLOGY

## 2021-07-19 PROCEDURE — 86592 SYPHILIS TEST NON-TREP QUAL: CPT | Performed by: OBSTETRICS & GYNECOLOGY

## 2021-07-19 PROCEDURE — 1126F AMNT PAIN NOTED NONE PRSNT: CPT | Mod: CPTII,S$GLB,, | Performed by: OBSTETRICS & GYNECOLOGY

## 2021-07-19 PROCEDURE — 99999 PR PBB SHADOW E&M-EST. PATIENT-LVL IV: CPT | Mod: PBBFAC,,, | Performed by: OBSTETRICS & GYNECOLOGY

## 2021-07-19 PROCEDURE — 87481 CANDIDA DNA AMP PROBE: CPT | Mod: 59 | Performed by: OBSTETRICS & GYNECOLOGY

## 2021-07-19 PROCEDURE — 80048 BASIC METABOLIC PNL TOTAL CA: CPT | Performed by: OBSTETRICS & GYNECOLOGY

## 2021-07-19 PROCEDURE — 3008F PR BODY MASS INDEX (BMI) DOCUMENTED: ICD-10-PCS | Mod: CPTII,S$GLB,, | Performed by: OBSTETRICS & GYNECOLOGY

## 2021-07-19 PROCEDURE — 85025 COMPLETE CBC W/AUTO DIFF WBC: CPT | Performed by: OBSTETRICS & GYNECOLOGY

## 2021-07-19 PROCEDURE — 99396 PR PREVENTIVE VISIT,EST,40-64: ICD-10-PCS | Mod: S$GLB,,, | Performed by: OBSTETRICS & GYNECOLOGY

## 2021-07-19 PROCEDURE — 80074 ACUTE HEPATITIS PANEL: CPT | Performed by: OBSTETRICS & GYNECOLOGY

## 2021-07-19 PROCEDURE — 99396 PREV VISIT EST AGE 40-64: CPT | Mod: S$GLB,,, | Performed by: OBSTETRICS & GYNECOLOGY

## 2021-07-20 LAB
ANION GAP SERPL CALC-SCNC: 9 MMOL/L (ref 8–16)
BUN SERPL-MCNC: 10 MG/DL (ref 6–20)
CALCIUM SERPL-MCNC: 9 MG/DL (ref 8.7–10.5)
CHLORIDE SERPL-SCNC: 107 MMOL/L (ref 95–110)
CHOLEST SERPL-MCNC: 149 MG/DL (ref 120–199)
CHOLEST/HDLC SERPL: 2.6 {RATIO} (ref 2–5)
CO2 SERPL-SCNC: 25 MMOL/L (ref 23–29)
CREAT SERPL-MCNC: 0.7 MG/DL (ref 0.5–1.4)
EST. GFR  (AFRICAN AMERICAN): >60 ML/MIN/1.73 M^2
EST. GFR  (NON AFRICAN AMERICAN): >60 ML/MIN/1.73 M^2
ESTIMATED AVG GLUCOSE: 97 MG/DL (ref 68–131)
GLUCOSE SERPL-MCNC: 79 MG/DL (ref 70–110)
HAV IGM SERPL QL IA: NEGATIVE
HBA1C MFR BLD: 5 % (ref 4–5.6)
HBV CORE IGM SERPL QL IA: NEGATIVE
HBV SURFACE AG SERPL QL IA: NEGATIVE
HCV AB SERPL QL IA: NEGATIVE
HDLC SERPL-MCNC: 57 MG/DL (ref 40–75)
HDLC SERPL: 38.3 % (ref 20–50)
HIV 1+2 AB+HIV1 P24 AG SERPL QL IA: NEGATIVE
LDLC SERPL CALC-MCNC: 85 MG/DL (ref 63–159)
NONHDLC SERPL-MCNC: 92 MG/DL
POTASSIUM SERPL-SCNC: 4 MMOL/L (ref 3.5–5.1)
RPR SER QL: NORMAL
SODIUM SERPL-SCNC: 141 MMOL/L (ref 136–145)
T4 FREE SERPL-MCNC: 0.88 NG/DL (ref 0.71–1.51)
TRIGL SERPL-MCNC: 35 MG/DL (ref 30–150)
TSH SERPL DL<=0.005 MIU/L-ACNC: 0.35 UIU/ML (ref 0.4–4)

## 2021-07-22 LAB
BACTERIAL VAGINOSIS DNA: NEGATIVE
CANDIDA GLABRATA DNA: NEGATIVE
CANDIDA KRUSEI DNA: NEGATIVE
CANDIDA RRNA VAG QL PROBE: NEGATIVE
T VAGINALIS RRNA GENITAL QL PROBE: NEGATIVE

## 2021-07-26 ENCOUNTER — HOSPITAL ENCOUNTER (OUTPATIENT)
Dept: RADIOLOGY | Facility: HOSPITAL | Age: 51
Discharge: HOME | End: 2021-07-26
Attending: OBSTETRICS & GYNECOLOGY
Payer: COMMERCIAL

## 2021-07-26 DIAGNOSIS — Z12.31 SCREENING MAMMOGRAM, ENCOUNTER FOR: ICD-10-CM

## 2021-07-26 PROCEDURE — 77063 MAMMO DIGITAL SCREENING BILAT WITH TOMO: ICD-10-PCS | Mod: 26,,, | Performed by: RADIOLOGY

## 2021-07-26 PROCEDURE — 77067 SCR MAMMO BI INCL CAD: CPT | Mod: 26,,, | Performed by: RADIOLOGY

## 2021-07-26 PROCEDURE — 77067 MAMMO DIGITAL SCREENING BILAT WITH TOMO: ICD-10-PCS | Mod: 26,,, | Performed by: RADIOLOGY

## 2021-07-26 PROCEDURE — 77063 BREAST TOMOSYNTHESIS BI: CPT | Mod: 26,,, | Performed by: RADIOLOGY

## 2021-07-26 PROCEDURE — 77067 SCR MAMMO BI INCL CAD: CPT | Mod: TC,PN

## 2021-07-28 LAB
C TRACH DNA SPEC QL NAA+PROBE: NOT DETECTED
N GONORRHOEA DNA SPEC QL NAA+PROBE: NOT DETECTED

## 2021-08-23 ENCOUNTER — OFFICE VISIT (OUTPATIENT)
Dept: OTOLARYNGOLOGY | Facility: CLINIC | Age: 51
End: 2021-08-23
Payer: COMMERCIAL

## 2021-08-23 DIAGNOSIS — J31.0 CHRONIC RHINITIS: Primary | ICD-10-CM

## 2021-08-23 DIAGNOSIS — R09.82 POST-NASAL DRIP: ICD-10-CM

## 2021-08-23 PROCEDURE — 99999 PR PBB SHADOW E&M-EST. PATIENT-LVL III: ICD-10-PCS | Mod: PBBFAC,,, | Performed by: NURSE PRACTITIONER

## 2021-08-23 PROCEDURE — 99203 OFFICE O/P NEW LOW 30 MIN: CPT | Mod: S$GLB,,, | Performed by: NURSE PRACTITIONER

## 2021-08-23 PROCEDURE — 99203 PR OFFICE/OUTPT VISIT, NEW, LEVL III, 30-44 MIN: ICD-10-PCS | Mod: S$GLB,,, | Performed by: NURSE PRACTITIONER

## 2021-08-23 PROCEDURE — 1159F MED LIST DOCD IN RCRD: CPT | Mod: CPTII,S$GLB,, | Performed by: NURSE PRACTITIONER

## 2021-08-23 PROCEDURE — 99999 PR PBB SHADOW E&M-EST. PATIENT-LVL III: CPT | Mod: PBBFAC,,, | Performed by: NURSE PRACTITIONER

## 2021-08-23 PROCEDURE — 3044F HG A1C LEVEL LT 7.0%: CPT | Mod: CPTII,S$GLB,, | Performed by: NURSE PRACTITIONER

## 2021-08-23 PROCEDURE — 1159F PR MEDICATION LIST DOCUMENTED IN MEDICAL RECORD: ICD-10-PCS | Mod: CPTII,S$GLB,, | Performed by: NURSE PRACTITIONER

## 2021-08-23 PROCEDURE — 1160F PR REVIEW ALL MEDS BY PRESCRIBER/CLIN PHARMACIST DOCUMENTED: ICD-10-PCS | Mod: CPTII,S$GLB,, | Performed by: NURSE PRACTITIONER

## 2021-08-23 PROCEDURE — 3044F PR MOST RECENT HEMOGLOBIN A1C LEVEL <7.0%: ICD-10-PCS | Mod: CPTII,S$GLB,, | Performed by: NURSE PRACTITIONER

## 2021-08-23 PROCEDURE — 1126F PR PAIN SEVERITY QUANTIFIED, NO PAIN PRESENT: ICD-10-PCS | Mod: CPTII,S$GLB,, | Performed by: NURSE PRACTITIONER

## 2021-08-23 PROCEDURE — 1160F RVW MEDS BY RX/DR IN RCRD: CPT | Mod: CPTII,S$GLB,, | Performed by: NURSE PRACTITIONER

## 2021-08-23 PROCEDURE — 1126F AMNT PAIN NOTED NONE PRSNT: CPT | Mod: CPTII,S$GLB,, | Performed by: NURSE PRACTITIONER

## 2021-08-23 RX ORDER — FLUTICASONE PROPIONATE 50 MCG
2 SPRAY, SUSPENSION (ML) NASAL DAILY
Qty: 16 G | Refills: 2 | Status: SHIPPED | OUTPATIENT
Start: 2021-08-23 | End: 2021-11-21

## 2021-08-23 RX ORDER — LEVOCETIRIZINE DIHYDROCHLORIDE 5 MG/1
5 TABLET, FILM COATED ORAL NIGHTLY
Qty: 30 TABLET | Refills: 11 | Status: SHIPPED | OUTPATIENT
Start: 2021-08-23 | End: 2023-07-31

## 2021-09-24 ENCOUNTER — LAB VISIT (OUTPATIENT)
Dept: PRIMARY CARE CLINIC | Facility: OTHER | Age: 51
End: 2021-09-24
Payer: COMMERCIAL

## 2021-09-24 DIAGNOSIS — Z20.822 ENCOUNTER FOR LABORATORY TESTING FOR COVID-19 VIRUS: ICD-10-CM

## 2021-09-24 PROCEDURE — U0003 INFECTIOUS AGENT DETECTION BY NUCLEIC ACID (DNA OR RNA); SEVERE ACUTE RESPIRATORY SYNDROME CORONAVIRUS 2 (SARS-COV-2) (CORONAVIRUS DISEASE [COVID-19]), AMPLIFIED PROBE TECHNIQUE, MAKING USE OF HIGH THROUGHPUT TECHNOLOGIES AS DESCRIBED BY CMS-2020-01-R: HCPCS | Performed by: INTERNAL MEDICINE

## 2021-09-25 LAB
SARS-COV-2 RNA RESP QL NAA+PROBE: NOT DETECTED
SARS-COV-2- CYCLE NUMBER: NORMAL

## 2022-10-06 ENCOUNTER — LAB VISIT (OUTPATIENT)
Dept: LAB | Facility: OTHER | Age: 52
End: 2022-10-06
Attending: OBSTETRICS & GYNECOLOGY
Payer: COMMERCIAL

## 2022-10-06 ENCOUNTER — OFFICE VISIT (OUTPATIENT)
Dept: OBSTETRICS AND GYNECOLOGY | Facility: CLINIC | Age: 52
End: 2022-10-06
Payer: COMMERCIAL

## 2022-10-06 VITALS
WEIGHT: 161.63 LBS | SYSTOLIC BLOOD PRESSURE: 122 MMHG | BODY MASS INDEX: 27.74 KG/M2 | DIASTOLIC BLOOD PRESSURE: 86 MMHG

## 2022-10-06 DIAGNOSIS — Z11.3 SCREEN FOR STD (SEXUALLY TRANSMITTED DISEASE): ICD-10-CM

## 2022-10-06 DIAGNOSIS — N89.8 VAGINAL DISCHARGE: ICD-10-CM

## 2022-10-06 DIAGNOSIS — Z12.11 ENCOUNTER FOR SCREENING COLONOSCOPY: ICD-10-CM

## 2022-10-06 DIAGNOSIS — Z01.419 WELL WOMAN EXAM WITH ROUTINE GYNECOLOGICAL EXAM: Primary | ICD-10-CM

## 2022-10-06 DIAGNOSIS — N95.1 MENOPAUSAL SYMPTOMS: ICD-10-CM

## 2022-10-06 LAB
HAV IGM SERPL QL IA: NORMAL
HBV CORE IGM SERPL QL IA: NORMAL
HBV SURFACE AG SERPL QL IA: NORMAL
HCV AB SERPL QL IA: NORMAL
HIV 1+2 AB+HIV1 P24 AG SERPL QL IA: NORMAL

## 2022-10-06 PROCEDURE — 99999 PR PBB SHADOW E&M-EST. PATIENT-LVL III: CPT | Mod: PBBFAC,,, | Performed by: OBSTETRICS & GYNECOLOGY

## 2022-10-06 PROCEDURE — 99999 PR PBB SHADOW E&M-EST. PATIENT-LVL III: ICD-10-PCS | Mod: PBBFAC,,, | Performed by: OBSTETRICS & GYNECOLOGY

## 2022-10-06 PROCEDURE — 3079F DIAST BP 80-89 MM HG: CPT | Mod: CPTII,S$GLB,, | Performed by: OBSTETRICS & GYNECOLOGY

## 2022-10-06 PROCEDURE — 1159F PR MEDICATION LIST DOCUMENTED IN MEDICAL RECORD: ICD-10-PCS | Mod: CPTII,S$GLB,, | Performed by: OBSTETRICS & GYNECOLOGY

## 2022-10-06 PROCEDURE — 87389 HIV-1 AG W/HIV-1&-2 AB AG IA: CPT | Performed by: OBSTETRICS & GYNECOLOGY

## 2022-10-06 PROCEDURE — 99396 PREV VISIT EST AGE 40-64: CPT | Mod: S$GLB,,, | Performed by: OBSTETRICS & GYNECOLOGY

## 2022-10-06 PROCEDURE — 3008F PR BODY MASS INDEX (BMI) DOCUMENTED: ICD-10-PCS | Mod: CPTII,S$GLB,, | Performed by: OBSTETRICS & GYNECOLOGY

## 2022-10-06 PROCEDURE — 1159F MED LIST DOCD IN RCRD: CPT | Mod: CPTII,S$GLB,, | Performed by: OBSTETRICS & GYNECOLOGY

## 2022-10-06 PROCEDURE — 36415 COLL VENOUS BLD VENIPUNCTURE: CPT | Performed by: OBSTETRICS & GYNECOLOGY

## 2022-10-06 PROCEDURE — 99396 PR PREVENTIVE VISIT,EST,40-64: ICD-10-PCS | Mod: S$GLB,,, | Performed by: OBSTETRICS & GYNECOLOGY

## 2022-10-06 PROCEDURE — 3074F SYST BP LT 130 MM HG: CPT | Mod: CPTII,S$GLB,, | Performed by: OBSTETRICS & GYNECOLOGY

## 2022-10-06 PROCEDURE — 3008F BODY MASS INDEX DOCD: CPT | Mod: CPTII,S$GLB,, | Performed by: OBSTETRICS & GYNECOLOGY

## 2022-10-06 PROCEDURE — 3079F PR MOST RECENT DIASTOLIC BLOOD PRESSURE 80-89 MM HG: ICD-10-PCS | Mod: CPTII,S$GLB,, | Performed by: OBSTETRICS & GYNECOLOGY

## 2022-10-06 PROCEDURE — 80074 ACUTE HEPATITIS PANEL: CPT | Performed by: OBSTETRICS & GYNECOLOGY

## 2022-10-06 PROCEDURE — 3074F PR MOST RECENT SYSTOLIC BLOOD PRESSURE < 130 MM HG: ICD-10-PCS | Mod: CPTII,S$GLB,, | Performed by: OBSTETRICS & GYNECOLOGY

## 2022-10-06 PROCEDURE — 81514 NFCT DS BV&VAGINITIS DNA ALG: CPT | Performed by: OBSTETRICS & GYNECOLOGY

## 2022-10-06 PROCEDURE — 86592 SYPHILIS TEST NON-TREP QUAL: CPT | Performed by: OBSTETRICS & GYNECOLOGY

## 2022-10-06 RX ORDER — LORATADINE 10 MG/1
10 TABLET ORAL DAILY
COMMUNITY
Start: 2022-06-14

## 2022-10-06 RX ORDER — PAROXETINE 10 MG/1
10 TABLET, FILM COATED ORAL EVERY MORNING
Qty: 30 TABLET | Refills: 11 | Status: SHIPPED | OUTPATIENT
Start: 2022-10-06 | End: 2023-07-31

## 2022-10-06 RX ORDER — HYDROCORTISONE 25 MG/G
CREAM TOPICAL 2 TIMES DAILY
COMMUNITY
Start: 2022-09-14 | End: 2023-07-21

## 2022-10-06 RX ORDER — VALACYCLOVIR HYDROCHLORIDE 500 MG/1
500 TABLET, FILM COATED ORAL 2 TIMES DAILY
COMMUNITY
Start: 2022-09-19 | End: 2023-07-21

## 2022-10-06 NOTE — PROGRESS NOTES
"History & Physical  Gynecology      SUBJECTIVE:     Chief Complaint: Well Woman       History of Present Illness:    Eva Hair is a 52 y.o. female  here for annual routine Pap and checkup. No LMP recorded. Patient has had a hysterectomy.  Pt has hx of RICKIE/BS for AUB in .  Pt reports still having intermittnet leakage from left side of incision.  Will notice "wetness" on her underwear every few months over the left side of the incision.    Pt reports that she has been having hot flashes, happening 3-4 times a day and also at night.  Pt has also been having some insomnia and mood swings happening daily.  On Prozac currently but does not feel that this is helping her symptoms.  Pt is open to other medication options.  denies break through bleeding.   denies vaginal itching or irritation.    complains of vaginal discharge x 1 week    She is sexually active with 1 partner    History of abnormal pap: No  Last MMG: yes, done this year at [facility].   Sister, maternal aunt with breast and ovarian cancer.  Pt is concerned about ovarian cancer.  Last Colonoscopy:  Yes          Review of patient's allergies indicates:   Allergen Reactions    Cat/feline products     Grass pollen-red top, standard     Iodine and iodide containing products Swelling    Milk containing products      Whole Milk - Headache    Oxycodone Other (See Comments)     Causes hallucinations    Pecan nut      Headache    Pork/porcine containing products Other (See Comments)     Headache       Past Medical History:   Diagnosis Date    Eczema 2018    Environmental and seasonal allergies 2018    Mild persistent asthma without complication 2018     Past Surgical History:   Procedure Laterality Date     SECTION      x 2    HEMORRHOID SURGERY      HYSTERECTOMY      ovaries intact     OB History          2    Para   2    Term   2            AB        Living             SAB        IAB        Ectopic        Multiple        " Live Births                   Family History   Problem Relation Age of Onset    Arthritis Mother         gout    Hypertension Father     Cerebral aneurysm Father 72    Diabetes Paternal Aunt     Diabetes Maternal Grandmother     Diabetes Maternal Grandfather     Breast cancer Maternal Aunt 56    Breast cancer Sister 56    Breast cancer Maternal Aunt         dx in 50s     Social History     Tobacco Use    Smoking status: Never    Smokeless tobacco: Never   Substance Use Topics    Alcohol use: Yes     Comment: 2x/year    Drug use: No       Current Outpatient Medications   Medication Sig    albuterol (PROVENTIL HFA) 90 mcg/actuation inhaler Inhale 2 puffs into the lungs once daily. Rescue    azelastine (ASTELIN) 137 mcg (0.1 %) nasal spray 1 spray by Nasal route daily as needed.    budesonide-formoterol 160-4.5 mcg (SYMBICORT) 160-4.5 mcg/actuation HFAA Inhale 2 puffs into the lungs once daily. Controller    EScitalopram oxalate (LEXAPRO) 20 MG tablet     eszopiclone (LUNESTA) 2 MG Tab     hydrocortisone 2.5 % cream Apply topically 2 (two) times daily.    levocetirizine (XYZAL) 5 MG tablet Take 1 tablet (5 mg total) by mouth every evening.    loratadine (CLARITIN) 10 mg tablet Take 10 mg by mouth once daily.    montelukast (SINGULAIR) 10 mg tablet Take 10 mg by mouth once daily.    olopatadine (PATANOL) 0.1 % ophthalmic solution INSTILL 2 DROPS INTO BOTH EARS TWICE A DAY    oxybutynin (DITROPAN-XL) 5 MG TR24 Take 1 tablet (5 mg total) by mouth once daily.    paroxetine (PAXIL) 10 MG tablet Take 1 tablet (10 mg total) by mouth every morning.    pimecrolimus (ELIDEL) 1 % cream     prednisoLONE acetate (PRED FORTE) 1 % DrpS INSTILL 1 DROP TO BOTH EYES IN THE MORNING    valACYclovir (VALTREX) 500 MG tablet Take 500 mg by mouth 2 (two) times daily.     No current facility-administered medications for this visit.         Review of Systems:  Review of Systems   Constitutional:  Negative for activity change, appetite change,  chills, fatigue, fever and unexpected weight change.   Respiratory:  Negative for cough, shortness of breath and wheezing.    Cardiovascular:  Negative for chest pain and leg swelling.   Gastrointestinal:  Negative for abdominal pain, blood in stool, constipation, diarrhea, nausea and vomiting.   Endocrine: Negative for diabetes, hair loss and hot flashes.   Genitourinary:  Negative for decreased libido, dyspareunia, dysuria, frequency, menstrual problem, pelvic pain, vaginal bleeding, vaginal discharge, vaginal pain, postcoital bleeding and postmenopausal bleeding.   Musculoskeletal:  Negative for back pain.   Integumentary:  Negative for acne, hair changes, breast mass, nipple discharge and breast skin changes.   Neurological:  Negative for headaches.   Psychiatric/Behavioral:  Negative for sleep disturbance. The patient is not nervous/anxious.    Breast: Negative for mass, mastodynia, nipple discharge and skin changes     OBJECTIVE:     Physical Exam:  Physical Exam  Constitutional:       Appearance: She is well-developed.   HENT:      Head: Normocephalic and atraumatic.   Eyes:      General: No scleral icterus.        Right eye: No discharge.         Left eye: No discharge.      Conjunctiva/sclera: Conjunctivae normal.   Pulmonary:      Effort: Pulmonary effort is normal.      Breath sounds: No stridor.   Chest:      Chest wall: No mass or tenderness.   Breasts:     Breasts are symmetrical.      Right: No inverted nipple, mass, nipple discharge, skin change or tenderness.      Left: No inverted nipple, mass, nipple discharge, skin change or tenderness.   Abdominal:      General: There is no distension.      Palpations: Abdomen is soft.      Tenderness: There is no abdominal tenderness.       Genitourinary:     Labia:         Right: No rash, tenderness, lesion or injury.         Left: No rash, tenderness, lesion or injury.       Vagina: Normal.      Cervix: No cervical motion tenderness, discharge or friability.       Adnexa:         Right: No mass, tenderness or fullness.          Left: No mass, tenderness or fullness.        Comments: Normal external genitalia.  Normal hair distribution.  Urethral meatus normal. Uterus, cervix surgically absent.  No adnexal masses or tenderness. Vaginal mucosa with mild atrophy but otherwise normal.  Cuff intact  Musculoskeletal:         General: Normal range of motion.   Skin:     General: Skin is warm and dry.   Neurological:      Mental Status: She is alert and oriented to person, place, and time.   Psychiatric:         Behavior: Behavior normal.         Thought Content: Thought content normal.         Judgment: Judgment normal.         ASSESSMENT:       ICD-10-CM ICD-9-CM    1. Well woman exam with routine gynecological exam  Z01.419 V72.31       2. Encounter for screening colonoscopy  Z12.11 V76.51 Case Request Endoscopy: COLONOSCOPY      3. Menopausal symptoms  N95.1 627.2 paroxetine (PAXIL) 10 MG tablet      4. Vaginal discharge  N89.8 623.5 Vaginosis Screen by DNA Probe      5. Screen for STD (sexually transmitted disease)  Z11.3 V74.5 HIV 1/2 Ag/Ab (4th Gen)      RPR      Hepatitis Panel, Acute             Plan:      Eav was seen today for gynecologic exam.    Diagnoses and all orders for this visit:    Encounter for well woman exam with routine gynecological exam  - Pap smears no longer indicated  - Cscope ordered  - MMG up to date  - Pt is very concerned about ovarian cancer given her family hx.  Pt would like to consider bilateral oophorectomy.    - Incision from hyst doesn't show any leakage.  Pt it let me know when its leaking and will bring her in to look at.  If this is a seroma that needs to be opened, will consider doing this and oophorectomy at the same time.    Encounter for screening colonoscopy  -     Case Request Endoscopy: COLONOSCOPY    Menopausal symptoms  - Discussed hormonal vs non-hormonal options.  Discussed pros and cons of each.  Pt would like to try  the non-hormonal option first  - Will start on paxil.  -     paroxetine (PAXIL) 10 MG tablet; Take 1 tablet (10 mg total) by mouth every morning.    Vaginal discharge  -     Vaginosis Screen by DNA Probe    Screen for STD (sexually transmitted disease)  -     HIV 1/2 Ag/Ab (4th Gen); Future  -     RPR; Future  -     Hepatitis Panel, Acute; Future        Orders Placed This Encounter   Procedures    Vaginosis Screen by DNA Probe    HIV 1/2 Ag/Ab (4th Gen)    RPR    Hepatitis Panel, Acute         Follow up in about 1 year (around 10/6/2023) for annual.     Counseling time: 15 minutes    Kiara Love

## 2022-10-07 LAB — RPR SER QL: NORMAL

## 2022-10-19 DIAGNOSIS — Z12.11 ENCOUNTER FOR SCREENING COLONOSCOPY: Primary | ICD-10-CM

## 2022-12-16 ENCOUNTER — CLINICAL SUPPORT (OUTPATIENT)
Dept: ENDOSCOPY | Facility: HOSPITAL | Age: 52
End: 2022-12-16
Attending: OBSTETRICS & GYNECOLOGY
Payer: COMMERCIAL

## 2022-12-16 VITALS — WEIGHT: 165 LBS | HEIGHT: 64 IN | BODY MASS INDEX: 28.17 KG/M2

## 2022-12-16 DIAGNOSIS — Z12.11 ENCOUNTER FOR SCREENING COLONOSCOPY: ICD-10-CM

## 2022-12-16 RX ORDER — POLYETHYLENE GLYCOL 3350, SODIUM SULFATE ANHYDROUS, SODIUM BICARBONATE, SODIUM CHLORIDE, POTASSIUM CHLORIDE 236; 22.74; 6.74; 5.86; 2.97 G/4L; G/4L; G/4L; G/4L; G/4L
4 POWDER, FOR SOLUTION ORAL ONCE
Qty: 4000 ML | Refills: 0 | Status: SHIPPED | OUTPATIENT
Start: 2022-12-16 | End: 2022-12-16

## 2023-01-08 ENCOUNTER — PATIENT MESSAGE (OUTPATIENT)
Dept: ENDOSCOPY | Facility: HOSPITAL | Age: 53
End: 2023-01-08
Payer: COMMERCIAL

## 2023-01-09 ENCOUNTER — TELEPHONE (OUTPATIENT)
Dept: ENDOSCOPY | Facility: HOSPITAL | Age: 53
End: 2023-01-09
Payer: COMMERCIAL

## 2023-01-17 ENCOUNTER — TELEPHONE (OUTPATIENT)
Dept: ENDOSCOPY | Facility: HOSPITAL | Age: 53
End: 2023-01-17
Payer: COMMERCIAL

## 2023-01-17 NOTE — TELEPHONE ENCOUNTER
Returned patient's voicemail to endoscopy scheduling main line. No answer. Left voicemail message with main phone number provided.

## 2023-03-03 ENCOUNTER — TELEPHONE (OUTPATIENT)
Dept: ENDOSCOPY | Facility: HOSPITAL | Age: 53
End: 2023-03-03
Payer: COMMERCIAL

## 2023-03-06 ENCOUNTER — ANESTHESIA EVENT (OUTPATIENT)
Dept: ENDOSCOPY | Facility: HOSPITAL | Age: 53
End: 2023-03-06
Payer: COMMERCIAL

## 2023-03-06 ENCOUNTER — HOSPITAL ENCOUNTER (OUTPATIENT)
Facility: HOSPITAL | Age: 53
Discharge: HOME | End: 2023-03-06
Attending: STUDENT IN AN ORGANIZED HEALTH CARE EDUCATION/TRAINING PROGRAM | Admitting: STUDENT IN AN ORGANIZED HEALTH CARE EDUCATION/TRAINING PROGRAM
Payer: COMMERCIAL

## 2023-03-06 ENCOUNTER — ANESTHESIA (OUTPATIENT)
Dept: ENDOSCOPY | Facility: HOSPITAL | Age: 53
End: 2023-03-06
Payer: COMMERCIAL

## 2023-03-06 VITALS
BODY MASS INDEX: 28.17 KG/M2 | HEIGHT: 64 IN | OXYGEN SATURATION: 100 % | TEMPERATURE: 98 F | WEIGHT: 165 LBS | HEART RATE: 67 BPM | SYSTOLIC BLOOD PRESSURE: 145 MMHG | DIASTOLIC BLOOD PRESSURE: 78 MMHG | RESPIRATION RATE: 27 BRPM

## 2023-03-06 DIAGNOSIS — Z12.11 COLON CANCER SCREENING: ICD-10-CM

## 2023-03-06 PROCEDURE — 37000009 HC ANESTHESIA EA ADD 15 MINS: Performed by: STUDENT IN AN ORGANIZED HEALTH CARE EDUCATION/TRAINING PROGRAM

## 2023-03-06 PROCEDURE — G0121 COLON CA SCRN NOT HI RSK IND: ICD-10-PCS | Mod: ,,, | Performed by: STUDENT IN AN ORGANIZED HEALTH CARE EDUCATION/TRAINING PROGRAM

## 2023-03-06 PROCEDURE — G0121 COLON CA SCRN NOT HI RSK IND: HCPCS | Performed by: STUDENT IN AN ORGANIZED HEALTH CARE EDUCATION/TRAINING PROGRAM

## 2023-03-06 PROCEDURE — D9220A PRA ANESTHESIA: Mod: ,,, | Performed by: NURSE ANESTHETIST, CERTIFIED REGISTERED

## 2023-03-06 PROCEDURE — 99900035 HC TECH TIME PER 15 MIN (STAT)

## 2023-03-06 PROCEDURE — D9220A PRA ANESTHESIA: ICD-10-PCS | Mod: ,,, | Performed by: NURSE ANESTHETIST, CERTIFIED REGISTERED

## 2023-03-06 PROCEDURE — 94761 N-INVAS EAR/PLS OXIMETRY MLT: CPT

## 2023-03-06 PROCEDURE — 37000008 HC ANESTHESIA 1ST 15 MINUTES: Performed by: STUDENT IN AN ORGANIZED HEALTH CARE EDUCATION/TRAINING PROGRAM

## 2023-03-06 PROCEDURE — G0121 COLON CA SCRN NOT HI RSK IND: HCPCS | Mod: ,,, | Performed by: STUDENT IN AN ORGANIZED HEALTH CARE EDUCATION/TRAINING PROGRAM

## 2023-03-06 PROCEDURE — 25000003 PHARM REV CODE 250: Performed by: NURSE ANESTHETIST, CERTIFIED REGISTERED

## 2023-03-06 RX ORDER — PROPOFOL 10 MG/ML
VIAL (ML) INTRAVENOUS
Status: DISCONTINUED | OUTPATIENT
Start: 2023-03-06 | End: 2023-03-06

## 2023-03-06 RX ORDER — SODIUM CHLORIDE 9 MG/ML
INJECTION, SOLUTION INTRAVENOUS CONTINUOUS
Status: DISCONTINUED | OUTPATIENT
Start: 2023-03-06 | End: 2023-03-06 | Stop reason: HOSPADM

## 2023-03-06 RX ORDER — LIDOCAINE HCL/PF 100 MG/5ML
SYRINGE (ML) INTRAVENOUS
Status: DISCONTINUED | OUTPATIENT
Start: 2023-03-06 | End: 2023-03-06

## 2023-03-06 RX ORDER — PROPOFOL 10 MG/ML
VIAL (ML) INTRAVENOUS CONTINUOUS PRN
Status: DISCONTINUED | OUTPATIENT
Start: 2023-03-06 | End: 2023-03-06

## 2023-03-06 RX ADMIN — LIDOCAINE HYDROCHLORIDE 50 MG: 20 INJECTION, SOLUTION INTRAVENOUS at 07:03

## 2023-03-06 RX ADMIN — SODIUM CHLORIDE: 0.9 INJECTION, SOLUTION INTRAVENOUS at 07:03

## 2023-03-06 RX ADMIN — Medication 150 MCG/KG/MIN: at 07:03

## 2023-03-06 RX ADMIN — Medication 80 MG: at 07:03

## 2023-03-06 NOTE — PROVATION PATIENT INSTRUCTIONS
Discharge Summary/Instructions after an Endoscopic Procedure  Patient Name: Eva Hair  Patient MRN: [id number]  Patient YOB: 1970  Monday, March 6, 2023  Emory Flores MD  Dear patient,  As a result of recent federal legislation (The Federal Cures Act), you may   receive lab or pathology results from your procedure in your [facility]   account before your physician is able to contact you. Your physician or   their representative will relay the results to you with their   recommendations at their soonest availability.  Thank you,  RESTRICTIONS:  During your procedure today, you received medications for sedation.  These   medications may affect your judgment, balance and coordination.  Therefore,   for 24 hours, you have the following restrictions:   - DO NOT drive a car, operate machinery, make legal/financial decisions,   sign important papers or drink alcohol.    ACTIVITY:  Today: no heavy lifting, straining or running due to procedural   sedation/anesthesia.  The following day: return to full activity including work.  DIET:  Eat and drink normally unless instructed otherwise.     TREATMENT FOR COMMON SIDE EFFECTS:  - Mild abdominal pain, nausea, belching, bloating or excessive gas:  rest,   eat lightly and use a heating pad.  - Sore Throat: treat with throat lozenges and/or gargle with warm salt   water.  - Because air was used during the procedure, expelling large amounts of air   from your rectum or belching is normal.  - If a bowel prep was taken, you may not have a bowel movement for 1-3 days.    This is normal.  SYMPTOMS TO WATCH FOR AND REPORT TO YOUR PHYSICIAN:  1. Abdominal pain or bloating, other than gas cramps.  2. Chest pain.  3. Back pain.  4. Signs of infection such as: chills or fever occurring within 24 hours   after the procedure.  5. Rectal bleeding, which would show as bright red, maroon, or black stools.   (A tablespoon of blood from the rectum is not serious, especially  if   hemorrhoids are present.)  6. Vomiting.  7. Weakness or dizziness.  GO DIRECTLY TO THE NEAREST EMERGENCY ROOM IF YOU HAVE ANY OF THE FOLLOWING:      Difficulty breathing              Chills and/or fever over 101 F   Persistent vomiting and/or vomiting blood   Severe abdominal pain   Severe chest pain   Black, tarry stools   Bleeding- more than one tablespoon   Any other symptom or condition that you feel may need urgent attention  Your doctor recommends these additional instructions:  If any biopsies were taken, your doctors clinic will contact you in 1 to 2   weeks with any results.  - Discharge patient to home (ambulatory).   - Patient has a contact number available for emergencies.  The signs and   symptoms of potential delayed complications were discussed with the   patient.  Return to normal activities tomorrow.  Written discharge   instructions were provided to the patient.   - Resume previous diet.   - Continue present medications.   - Return to primary care physician as previously scheduled.   - Repeat colonoscopy in 10 years for screening purposes.  For questions, problems or results please call your physician - Emory Flores MD at Work:  [phone].  [facility], EMERGENCY ROOM PHONE NUMBER: [phone]  IF A COMPLICATION OR EMERGENCY SITUATION ARISES AND YOU ARE UNABLE TO REACH   YOUR PHYSICIAN - GO DIRECTLY TO THE EMERGENCY ROOM.  Emory Flores MD  3/6/2023 7:44:29 AM  This report has been verified and signed electronically.  Dear patient,  As a result of recent federal legislation (The Federal Cures Act), you may   receive lab or pathology results from your procedure in your [facility]   account before your physician is able to contact you. Your physician or   their representative will relay the results to you with their   recommendations at their soonest availability.  Thank you,  [employer]ATION

## 2023-03-06 NOTE — ANESTHESIA PREPROCEDURE EVALUATION
2023  Eva Hair is a 52 y.o., female.  Past Medical History:   Diagnosis Date    Eczema 2018    Environmental and seasonal allergies 2018    Mild persistent asthma without complication 2018     Past Surgical History:   Procedure Laterality Date     SECTION      x 2    HEMORRHOID SURGERY      HYSTERECTOMY      ovaries intact         Pre-op Assessment    I have reviewed the Patient Summary Reports.     I have reviewed the Nursing Notes. I have reviewed the NPO Status.   I have reviewed the Medications.     Review of Systems  Anesthesia Hx:  No problems with previous Anesthesia    Social:  Non-Smoker    Hematology/Oncology:  Hematology Normal   Oncology Normal     EENT/Dental:  EENT/Dental Normal Seasonal allergies    Cardiovascular:  Cardiovascular Normal     Pulmonary:   Asthma mild Occasional inhaler use   Renal/:  Renal/ Normal     Hepatic/GI:  Hepatic/GI Normal Bowel Prep.    Musculoskeletal:  Musculoskeletal Normal    Neurological:  Neurology Normal    Endocrine:  Endocrine Normal    Dermatological:  Skin Normal        Physical Exam  General: Well nourished, Cooperative, Alert and Oriented    Airway:  Mallampati: II   Mouth Opening: Normal  TM Distance: Normal  Tongue: Normal  Neck ROM: Normal ROM    Dental:  Intact  Caps present      Anesthesia Plan  Type of Anesthesia, risks & benefits discussed:    Anesthesia Type: Gen Natural Airway  Intra-op Monitoring Plan: Standard ASA Monitors  Induction:  IV  Informed Consent: Informed consent signed with the Patient and all parties understand the risks and agree with anesthesia plan.  All questions answered.   ASA Score: 2  Day of Surgery Review of History & Physical: H&P Update referred to the surgeon/provider.    Ready For Surgery From Anesthesia Perspective.     .

## 2023-03-06 NOTE — ANESTHESIA POSTPROCEDURE EVALUATION
Anesthesia Post Evaluation    Patient: Eva Hair    Procedure(s) Performed: Procedure(s) (LRB):  COLONOSCOPY (N/A)    Final Anesthesia Type: general      Patient location during evaluation: PACU  Patient participation: Yes- Able to Participate  Level of consciousness: awake  Post-procedure vital signs: reviewed and stable  Pain management: adequate  Airway patency: patent    PONV status at discharge: No PONV  Anesthetic complications: no      Cardiovascular status: blood pressure returned to baseline  Respiratory status: unassisted  Hydration status: euvolemic  Follow-up not needed.          Vitals Value Taken Time   /61 03/06/23 0747   Temp 36.5 °C (97.7 °F) 03/06/23 0748   Pulse 76 03/06/23 0756   Resp 14 03/06/23 0756   SpO2 100 % 03/06/23 0756   Vitals shown include unvalidated device data.      No case tracking events are documented in the log.      Pain/Hernán Score: Hernán Score: 10 (3/6/2023  7:44 AM)

## 2023-03-06 NOTE — TRANSFER OF CARE
"Anesthesia Transfer of Care Note    Patient: Eva Hair    Procedure(s) Performed: Procedure(s) (LRB):  COLONOSCOPY (N/A)    Patient location: GI    Anesthesia Type: general    Transport from OR: Transported from OR on room air with adequate spontaneous ventilation    Post pain: adequate analgesia    Post assessment: no apparent anesthetic complications and tolerated procedure well    Post vital signs: stable    Level of consciousness: awake and alert    Nausea/Vomiting: no nausea/vomiting    Complications: none    Transfer of care protocol was followed      Last vitals:   Visit Vitals  /75 (BP Location: Right arm, Patient Position: Lying)   Pulse 83   Temp 36.7 °C (98.1 °F) (Temporal)   Resp 20   Ht 5' 4" (1.626 m)   Wt 74.8 kg (165 lb)   SpO2 100%   Breastfeeding No   BMI 28.32 kg/m²     "

## 2023-03-06 NOTE — H&P
Short Stay Endoscopy History and Physical    PCP - Ann Trujillo MD (Inactive)  Referring Physician - Kiara Love MD  [address] [facility] [address]  [place] [address]    Procedure - Colonoscopy  ASA - per anesthesia  Mallampati - per anesthesia  History of Anesthesia problems - no  Family history Anesthesia problems -  no   Plan of anesthesia - General    HPI  52 y.o. female  Reason for procedure:   Encounter for screening colonoscopy [Z12.11]        ROS:  Constitutional: No fevers, chills, No weight loss  CV: No chest pain  Pulm: No cough, No shortness of breath  GI: see HPI    Medical History:  has a past medical history of Eczema (2018), Environmental and seasonal allergies (2018), and Mild persistent asthma without complication (2018).    Surgical History:  has a past surgical history that includes Hysterectomy;  section; and Hemorrhoid surgery.    Family History: family history includes Arthritis in her mother; Breast cancer in her maternal aunt; Breast cancer (age of onset: 56) in her maternal aunt and sister; Cerebral aneurysm (age of onset: 72) in her father; Diabetes in her maternal grandfather, maternal grandmother, and paternal aunt; Hypertension in her father..    Social History:  reports that she has never smoked. She has never used smokeless tobacco. She reports current alcohol use. She reports that she does not use drugs.    Review of patient's allergies indicates:   Allergen Reactions    Cat/feline products     Grass pollen-red top, standard     Iodine and iodide containing products Swelling    Milk containing products      Whole Milk - Headache    Oxycodone Other (See Comments)     Causes hallucinations    Pecan nut      Headache    Pork/porcine containing products Other (See Comments)     Headache       Medications:   Medications Prior to Admission   Medication Sig Dispense Refill Last Dose    albuterol (PROVENTIL HFA) 90 mcg/actuation inhaler Inhale 2 puffs  into the lungs once daily. Rescue       azelastine (ASTELIN) 137 mcg (0.1 %) nasal spray 1 spray by Nasal route daily as needed.  4     budesonide-formoterol 160-4.5 mcg (SYMBICORT) 160-4.5 mcg/actuation HFAA Inhale 2 puffs into the lungs once daily. Controller 10.2 g 5     EScitalopram oxalate (LEXAPRO) 20 MG tablet        eszopiclone (LUNESTA) 2 MG Tab        hydrocortisone 2.5 % cream Apply topically 2 (two) times daily.       levocetirizine (XYZAL) 5 MG tablet Take 1 tablet (5 mg total) by mouth every evening. 30 tablet 11     loratadine (CLARITIN) 10 mg tablet Take 10 mg by mouth once daily.       montelukast (SINGULAIR) 10 mg tablet Take 10 mg by mouth once daily.  4     olopatadine (PATANOL) 0.1 % ophthalmic solution INSTILL 2 DROPS INTO BOTH EARS TWICE A DAY  4     oxybutynin (DITROPAN-XL) 5 MG TR24 Take 1 tablet (5 mg total) by mouth once daily. 90 tablet 3     paroxetine (PAXIL) 10 MG tablet Take 1 tablet (10 mg total) by mouth every morning. 30 tablet 11     pimecrolimus (ELIDEL) 1 % cream        prednisoLONE acetate (PRED FORTE) 1 % DrpS INSTILL 1 DROP TO BOTH EYES IN THE MORNING       valACYclovir (VALTREX) 500 MG tablet Take 500 mg by mouth 2 (two) times daily.          Physical Exam:    Vital Signs: There were no vitals filed for this visit.    General Appearance: Well appearing in no acute distress  Abdomen: Soft, non tender, non distended with normal bowel sounds, no masses    Labs:  Lab Results   Component Value Date    WBC 4.60 07/19/2021    HGB 10.7 (L) 07/19/2021    HCT 33.8 (L) 07/19/2021     07/19/2021    CHOL 149 07/19/2021    TRIG 35 07/19/2021    HDL 57 07/19/2021    ALT 12 06/26/2017    AST 14 06/26/2017     07/19/2021    K 4.0 07/19/2021     07/19/2021    CREATININE 0.7 07/19/2021    BUN 10 07/19/2021    CO2 25 07/19/2021    TSH 0.348 (L) 07/19/2021    INR 1.0 09/15/2010    HGBA1C 5.0 07/19/2021       I have explained the risks and benefits of this endoscopic procedure  to the patient including but not limited to bleeding, inflammation, infection, perforation, and death.      Emory Flores MD

## 2023-07-21 ENCOUNTER — LAB VISIT (OUTPATIENT)
Dept: LAB | Facility: HOSPITAL | Age: 53
End: 2023-07-21
Attending: OBSTETRICS & GYNECOLOGY
Payer: COMMERCIAL

## 2023-07-21 ENCOUNTER — OFFICE VISIT (OUTPATIENT)
Dept: OBSTETRICS AND GYNECOLOGY | Facility: CLINIC | Age: 53
End: 2023-07-21
Payer: COMMERCIAL

## 2023-07-21 VITALS
SYSTOLIC BLOOD PRESSURE: 122 MMHG | WEIGHT: 166 LBS | HEIGHT: 64 IN | BODY MASS INDEX: 28.34 KG/M2 | DIASTOLIC BLOOD PRESSURE: 80 MMHG

## 2023-07-21 DIAGNOSIS — Z11.3 SCREEN FOR STD (SEXUALLY TRANSMITTED DISEASE): Primary | ICD-10-CM

## 2023-07-21 DIAGNOSIS — Z00.00 HEALTHCARE MAINTENANCE: ICD-10-CM

## 2023-07-21 DIAGNOSIS — Z11.3 SCREEN FOR STD (SEXUALLY TRANSMITTED DISEASE): ICD-10-CM

## 2023-07-21 DIAGNOSIS — Z12.31 SCREENING MAMMOGRAM, ENCOUNTER FOR: ICD-10-CM

## 2023-07-21 LAB
BACTERIAL VAGINOSIS DNA: NEGATIVE
CANDIDA GLABRATA DNA: NEGATIVE
CANDIDA KRUSEI DNA: NEGATIVE
CANDIDA RRNA VAG QL PROBE: NEGATIVE
CHOLEST SERPL-MCNC: 175 MG/DL (ref 120–199)
CHOLEST/HDLC SERPL: 3.1 {RATIO} (ref 2–5)
ESTIMATED AVG GLUCOSE: 105 MG/DL (ref 68–131)
HAV IGM SERPL QL IA: NORMAL
HBA1C MFR BLD: 5.3 % (ref 4–5.6)
HBV CORE IGM SERPL QL IA: NORMAL
HBV SURFACE AG SERPL QL IA: NORMAL
HCV AB SERPL QL IA: NORMAL
HDLC SERPL-MCNC: 57 MG/DL (ref 40–75)
HDLC SERPL: 32.6 % (ref 20–50)
HIV 1+2 AB+HIV1 P24 AG SERPL QL IA: NORMAL
LDLC SERPL CALC-MCNC: 104.6 MG/DL (ref 63–159)
NONHDLC SERPL-MCNC: 118 MG/DL
RPR SER QL: NORMAL
T VAGINALIS RRNA GENITAL QL PROBE: NEGATIVE
T4 FREE SERPL-MCNC: 0.91 NG/DL (ref 0.71–1.51)
TRIGL SERPL-MCNC: 67 MG/DL (ref 30–150)
TSH SERPL DL<=0.005 MIU/L-ACNC: 0.39 UIU/ML (ref 0.4–4)

## 2023-07-21 PROCEDURE — 3008F PR BODY MASS INDEX (BMI) DOCUMENTED: ICD-10-PCS | Mod: CPTII,S$GLB,, | Performed by: OBSTETRICS & GYNECOLOGY

## 2023-07-21 PROCEDURE — 3008F BODY MASS INDEX DOCD: CPT | Mod: CPTII,S$GLB,, | Performed by: OBSTETRICS & GYNECOLOGY

## 2023-07-21 PROCEDURE — 80074 ACUTE HEPATITIS PANEL: CPT | Performed by: OBSTETRICS & GYNECOLOGY

## 2023-07-21 PROCEDURE — 3044F PR MOST RECENT HEMOGLOBIN A1C LEVEL <7.0%: ICD-10-PCS | Mod: CPTII,S$GLB,, | Performed by: OBSTETRICS & GYNECOLOGY

## 2023-07-21 PROCEDURE — 99999 PR PBB SHADOW E&M-EST. PATIENT-LVL II: CPT | Mod: PBBFAC,,, | Performed by: OBSTETRICS & GYNECOLOGY

## 2023-07-21 PROCEDURE — 3079F DIAST BP 80-89 MM HG: CPT | Mod: CPTII,S$GLB,, | Performed by: OBSTETRICS & GYNECOLOGY

## 2023-07-21 PROCEDURE — 3044F HG A1C LEVEL LT 7.0%: CPT | Mod: CPTII,S$GLB,, | Performed by: OBSTETRICS & GYNECOLOGY

## 2023-07-21 PROCEDURE — 86592 SYPHILIS TEST NON-TREP QUAL: CPT | Performed by: OBSTETRICS & GYNECOLOGY

## 2023-07-21 PROCEDURE — 36415 COLL VENOUS BLD VENIPUNCTURE: CPT | Mod: PN | Performed by: OBSTETRICS & GYNECOLOGY

## 2023-07-21 PROCEDURE — 81514 NFCT DS BV&VAGINITIS DNA ALG: CPT | Performed by: OBSTETRICS & GYNECOLOGY

## 2023-07-21 PROCEDURE — 3074F SYST BP LT 130 MM HG: CPT | Mod: CPTII,S$GLB,, | Performed by: OBSTETRICS & GYNECOLOGY

## 2023-07-21 PROCEDURE — 99213 OFFICE O/P EST LOW 20 MIN: CPT | Mod: S$GLB,,, | Performed by: OBSTETRICS & GYNECOLOGY

## 2023-07-21 PROCEDURE — 99213 PR OFFICE/OUTPT VISIT, EST, LEVL III, 20-29 MIN: ICD-10-PCS | Mod: S$GLB,,, | Performed by: OBSTETRICS & GYNECOLOGY

## 2023-07-21 PROCEDURE — 87591 N.GONORRHOEAE DNA AMP PROB: CPT | Performed by: OBSTETRICS & GYNECOLOGY

## 2023-07-21 PROCEDURE — 84439 ASSAY OF FREE THYROXINE: CPT | Performed by: OBSTETRICS & GYNECOLOGY

## 2023-07-21 PROCEDURE — 83036 HEMOGLOBIN GLYCOSYLATED A1C: CPT | Performed by: OBSTETRICS & GYNECOLOGY

## 2023-07-21 PROCEDURE — 3079F PR MOST RECENT DIASTOLIC BLOOD PRESSURE 80-89 MM HG: ICD-10-PCS | Mod: CPTII,S$GLB,, | Performed by: OBSTETRICS & GYNECOLOGY

## 2023-07-21 PROCEDURE — 87389 HIV-1 AG W/HIV-1&-2 AB AG IA: CPT | Performed by: OBSTETRICS & GYNECOLOGY

## 2023-07-21 PROCEDURE — 84443 ASSAY THYROID STIM HORMONE: CPT | Performed by: OBSTETRICS & GYNECOLOGY

## 2023-07-21 PROCEDURE — 3074F PR MOST RECENT SYSTOLIC BLOOD PRESSURE < 130 MM HG: ICD-10-PCS | Mod: CPTII,S$GLB,, | Performed by: OBSTETRICS & GYNECOLOGY

## 2023-07-21 PROCEDURE — 99999 PR PBB SHADOW E&M-EST. PATIENT-LVL II: ICD-10-PCS | Mod: PBBFAC,,, | Performed by: OBSTETRICS & GYNECOLOGY

## 2023-07-21 PROCEDURE — 80061 LIPID PANEL: CPT | Performed by: OBSTETRICS & GYNECOLOGY

## 2023-07-21 RX ORDER — FLUOXETINE HYDROCHLORIDE 40 MG/1
CAPSULE ORAL
COMMUNITY

## 2023-07-21 RX ORDER — IBUPROFEN 800 MG/1
800 TABLET ORAL 3 TIMES DAILY
COMMUNITY
Start: 2023-06-15

## 2023-07-21 RX ORDER — FLUTICASONE PROPIONATE 50 MCG
SPRAY, SUSPENSION (ML) NASAL
COMMUNITY

## 2023-07-21 NOTE — PROGRESS NOTES
History & Physical  Gynecology      SUBJECTIVE:     Chief Complaint: Gynecologic Exam       History of Present Illness:  Pt is a 54 y/o here for STD screening.  No new sexual partners.  Pt is having some vaginal itching.  No discharge.  No fever/chills.  Pt also needs MMG ordered.      Review of patient's allergies indicates:   Allergen Reactions    Cat/feline products     Grass pollen-red top, standard     Iodine and iodide containing products Swelling    Milk containing products (dairy)      Whole Milk - Headache    Oxycodone Other (See Comments)     Causes hallucinations    Pecan nut      Headache    Pork/porcine containing products Other (See Comments)     Headache       Past Medical History:   Diagnosis Date    Eczema 2018    Environmental and seasonal allergies 2018    Mild persistent asthma without complication 2018     Past Surgical History:   Procedure Laterality Date     SECTION      x 2    COLONOSCOPY N/A 3/6/2023    Procedure: COLONOSCOPY;  Surgeon: Emory Flores MD;  Location: [facility] ENDOSCOPY;  Service: Endoscopy;  Laterality: N/A;  instructions via portal - r/s-has peg prep-tb  pt needs a early Monday appt    HEMORRHOID SURGERY      HYSTERECTOMY      ovaries intact     OB History          2    Para   2    Term   2            AB        Living             SAB        IAB        Ectopic        Multiple        Live Births                   Family History   Problem Relation Age of Onset    Arthritis Mother         gout    Hypertension Father     Cerebral aneurysm Father 72    Diabetes Paternal Aunt     Diabetes Maternal Grandmother     Diabetes Maternal Grandfather     Breast cancer Maternal Aunt 56    Breast cancer Sister 56    Breast cancer Maternal Aunt         dx in 50s     Social History     Tobacco Use    Smoking status: Never    Smokeless tobacco: Never   Substance Use Topics    Alcohol use: Yes     Comment: 2x/year    Drug use: No       Current Outpatient  Medications   Medication Sig    albuterol (PROVENTIL/VENTOLIN HFA) 90 mcg/actuation inhaler Inhale 2 puffs into the lungs once daily. Rescue    azelastine (ASTELIN) 137 mcg (0.1 %) nasal spray 1 spray by Nasal route daily as needed.    budesonide-formoterol 160-4.5 mcg (SYMBICORT) 160-4.5 mcg/actuation HFAA Inhale 2 puffs into the lungs once daily. Controller    EScitalopram oxalate (LEXAPRO) 20 MG tablet     eszopiclone (LUNESTA) 2 MG Tab     FLUoxetine 40 MG capsule 1 CAP ORALLY ONCE DAILY.    fluticasone propionate (FLONASE) 50 mcg/actuation nasal spray APPLY 1 SPRAY INTO EACH NOSTRIL TWICE A DAY    ibuprofen (ADVIL,MOTRIN) 800 MG tablet Take 800 mg by mouth 3 (three) times daily.    levocetirizine (XYZAL) 5 MG tablet Take 1 tablet (5 mg total) by mouth every evening.    loratadine (CLARITIN) 10 mg tablet Take 10 mg by mouth once daily.    olopatadine (PATANOL) 0.1 % ophthalmic solution INSTILL 2 DROPS INTO BOTH EARS TWICE A DAY    oxybutynin (DITROPAN-XL) 5 MG TR24 Take 1 tablet (5 mg total) by mouth once daily.    paroxetine (PAXIL) 10 MG tablet Take 1 tablet (10 mg total) by mouth every morning.    pimecrolimus (ELIDEL) 1 % cream      No current facility-administered medications for this visit.         Review of Systems:  Review of Systems   Constitutional:  Negative for activity change, appetite change, chills, fatigue, fever and unexpected weight change.   Respiratory:  Negative for cough, shortness of breath and wheezing.    Cardiovascular:  Negative for chest pain and leg swelling.   Gastrointestinal:  Negative for abdominal pain, constipation, diarrhea, nausea and vomiting.   Endocrine: Negative for hair loss and hot flashes.   Genitourinary:  Positive for vaginal pain (vaginal itching). Negative for decreased libido, dyspareunia, dysuria, frequency, menstrual problem, pelvic pain, vaginal bleeding and vaginal discharge.   Integumentary:  Negative for acne, hair changes, nipple discharge and breast skin  changes.   Neurological:  Negative for headaches.   Psychiatric/Behavioral:  Negative for sleep disturbance.    Breast: Negative for mastodynia, nipple discharge and skin changes     OBJECTIVE:     Physical Exam:  Physical Exam  Constitutional:       Appearance: She is well-developed.   HENT:      Head: Normocephalic and atraumatic.   Eyes:      General: No scleral icterus.        Right eye: No discharge.         Left eye: No discharge.      Conjunctiva/sclera: Conjunctivae normal.   Neck:      Thyroid: No thyromegaly.   Cardiovascular:      Rate and Rhythm: Normal rate.   Pulmonary:      Effort: Pulmonary effort is normal.      Breath sounds: No stridor.   Abdominal:      General: There is no distension.      Palpations: Abdomen is soft.      Tenderness: There is no abdominal tenderness.   Genitourinary:     Labia:         Right: No rash, tenderness, lesion or injury.         Left: No rash, tenderness, lesion or injury.       Vagina: Vaginal discharge present. No bleeding.      Cervix: No cervical motion tenderness, discharge or friability.      Uterus: Not enlarged and not tender.       Adnexa:         Right: No mass, tenderness or fullness.          Left: No mass, tenderness or fullness.     Musculoskeletal:         General: Normal range of motion.   Skin:     General: Skin is warm and dry.   Neurological:      Mental Status: She is alert and oriented to person, place, and time.   Psychiatric:         Behavior: Behavior normal.         Thought Content: Thought content normal.         Judgment: Judgment normal.         ASSESSMENT:       ICD-10-CM ICD-9-CM    1. Screen for STD (sexually transmitted disease)  Z11.3 V74.5 Vaginosis Screen by DNA Probe      HIV 1/2 Ag/Ab (4th Gen)      RPR      Hepatitis Panel, Acute      C. trachomatis/N. gonorrhoeae by AMP DNA      2. Screening mammogram, encounter for  Z12.31 V76.12 Mammo Digital Screening Bilat w/ Nawaf      3. Healthcare maintenance  Z00.00 V70.0 TSH       Hemoglobin A1C      Lipid Panel             Plan:      Eva was seen today for gynecologic exam.    Diagnoses and all orders for this visit:    Screen for STD (sexually transmitted disease)  -     Vaginosis Screen by DNA Probe  -     HIV 1/2 Ag/Ab (4th Gen); Future  -     RPR; Future  -     Hepatitis Panel, Acute; Future  -     C. trachomatis/N. gonorrhoeae by AMP DNA    Screening mammogram, encounter for  -     Mammo Digital Screening Bilat w/ Nawaf; Future    Healthcare maintenance  -     TSH; Future  -     Hemoglobin A1C; Future  -     Lipid Panel; Future        Orders Placed This Encounter   Procedures    Vaginosis Screen by DNA Probe    C. trachomatis/N. gonorrhoeae by AMP DNA    Mammo Digital Screening Bilat w/ Nawaf    HIV 1/2 Ag/Ab (4th Gen)    RPR    Hepatitis Panel, Acute    TSH    Hemoglobin A1C    Lipid Panel       No follow-ups on file.     Face to Face time with patient: 15 min    20 minutes of total time spent on the encounter, which includes face to face time and non-face to face time preparing to see the patient (eg, review of tests), Obtaining and/or reviewing separately obtained history, Documenting clinical information in the electronic or other health record, Independently interpreting results (not separately reported) and communicating results to the patient/family/caregiver, or Care coordination (not separately reported).      Kiara Love

## 2023-07-22 LAB
C TRACH DNA SPEC QL NAA+PROBE: NOT DETECTED
N GONORRHOEA DNA SPEC QL NAA+PROBE: NOT DETECTED

## 2023-07-27 NOTE — PROGRESS NOTES
[facility] Note    Chief Complaint      Chief Complaint   Patient presents with    Annual Exam    Establish Care       History of Present Illness      Eva Hair is a 53 y.o. female who presents today for   Chief Complaint   Patient presents with    Annual Exam    Establish Care         Patient is new to me and here to establish primary care. She also has questions about recent thyroid levels. Feeling well and has not complaints. She reports feeling a lot of stress from work. Currently taking fluoxetine 40 mg daily. I have advised walking for 30 minutes for 2 days a week for 2 weeks and increasing a 30 minute day every 2 weeks. Patient verbalizes understanding.          Review of Systems   All 12 systems otherwise negative.       Family History:  family history includes Arthritis in her mother; Asthma in her brother; Breast cancer in her maternal aunt; Breast cancer (age of onset: 56) in her sister; Cerebral aneurysm (age of onset: 72) in her father; Diabetes in her maternal grandfather and maternal grandmother; Gout in her brother and mother; Hypertension in her brother and father; No Known Problems in her brother, brother, brother, daughter, paternal grandfather, paternal grandmother, sister, and son; Ovarian cancer in her maternal aunt.   Family history was reviewed with patient.     Medications:  Outpatient Encounter Medications as of 7/31/2023   Medication Sig Dispense Refill    albuterol (PROVENTIL/VENTOLIN HFA) 90 mcg/actuation inhaler Inhale 2 puffs into the lungs once daily. Rescue      azelastine (ASTELIN) 137 mcg (0.1 %) nasal spray 1 spray by Nasal route daily as needed.  4    eszopiclone (LUNESTA) 2 MG Tab       FLUoxetine 40 MG capsule 1 CAP ORALLY ONCE DAILY.      fluticasone propionate (FLONASE) 50 mcg/actuation nasal spray APPLY 1 SPRAY INTO EACH NOSTRIL TWICE A DAY      ibuprofen (ADVIL,MOTRIN) 800 MG tablet Take 800 mg by mouth 3 (three) times daily.      loratadine (CLARITIN)  "10 mg tablet Take 10 mg by mouth once daily.      olopatadine (PATANOL) 0.1 % ophthalmic solution INSTILL 2 DROPS INTO BOTH EARS TWICE A DAY  4    pimecrolimus (ELIDEL) 1 % cream       [DISCONTINUED] EScitalopram oxalate (LEXAPRO) 20 MG tablet       [DISCONTINUED] levocetirizine (XYZAL) 5 MG tablet Take 1 tablet (5 mg total) by mouth every evening. 30 tablet 11    [DISCONTINUED] paroxetine (PAXIL) 10 MG tablet Take 1 tablet (10 mg total) by mouth every morning. 30 tablet 11    budesonide-formoterol 160-4.5 mcg (SYMBICORT) 160-4.5 mcg/actuation HFAA Inhale 2 puffs into the lungs once daily. Controller 10.2 g 5    [DISCONTINUED] oxybutynin (DITROPAN-XL) 5 MG TR24 Take 1 tablet (5 mg total) by mouth once daily. 90 tablet 3     No facility-administered encounter medications on file as of 7/31/2023.       Allergies:  Review of patient's allergies indicates:   Allergen Reactions    Cat/feline products     Grass pollen-red top, standard     Iodine and iodide containing products Swelling    Milk containing products (dairy)      Whole Milk - Headache    Oxycodone Other (See Comments)     Causes hallucinations    Pecan nut      Headache    Pork/porcine containing products Other (See Comments)     Headache       Health Maintenance:  Health Maintenance   Topic Date Due    Mammogram  07/26/2022    TETANUS VACCINE  06/13/2026    Lipid Panel  07/21/2028    Hepatitis C Screening  Completed     Health Maintenance Topics with due status: Not Due       Topic Last Completion Date    Influenza Vaccine 10/15/2014    TETANUS VACCINE 06/13/2016    Colorectal Cancer Screening 03/06/2023    Hemoglobin A1c (Diabetic Prevention Screening) 07/21/2023    Lipid Panel 07/21/2023       Physical Exam      Vital Signs  Temp: 98.1 °F (36.7 °C)  Temp Source: Oral  Pulse: 69  SpO2: 98 %  BP: 130/88  BP Location: Right arm  Patient Position: Sitting  Pain Score: 0-No pain  Height and Weight  Height: 5' 4" (162.6 cm)  Weight: 76.3 kg (168 lb 3.4 oz)  BSA " (Calculated - sq m): 1.86 sq meters  BMI (Calculated): 28.9  Weight in (lb) to have BMI = 25: 145.3]    Physical Exam  Vitals reviewed.   Constitutional:       Appearance: Normal appearance. She is normal weight.   HENT:      Head: Normocephalic and atraumatic.      Nose: Nose normal.      Mouth/Throat:      Mouth: Mucous membranes are moist.      Pharynx: Oropharynx is clear.   Eyes:      Extraocular Movements: Extraocular movements intact.      Conjunctiva/sclera: Conjunctivae normal.      Pupils: Pupils are equal, round, and reactive to light.   Cardiovascular:      Rate and Rhythm: Normal rate and regular rhythm.      Pulses: Normal pulses.      Heart sounds: Normal heart sounds.   Pulmonary:      Effort: Pulmonary effort is normal.      Breath sounds: Normal breath sounds.   Musculoskeletal:         General: Normal range of motion.      Cervical back: Normal range of motion and neck supple.   Skin:     General: Skin is warm and dry.      Capillary Refill: Capillary refill takes less than 2 seconds.   Neurological:      General: No focal deficit present.      Mental Status: She is alert and oriented to person, place, and time. Mental status is at baseline.   Psychiatric:         Mood and Affect: Mood normal.         Behavior: Behavior normal.         Thought Content: Thought content normal.         Judgment: Judgment normal.            Assessment/Plan     Eva Hair is a 53 y.o.female with:    Anemia, unspecified type  -     CBC Auto Differential; Future; Expected date: 07/31/2023  -     Iron and TIBC; Future; Expected date: 07/31/2023  -     FERRITIN; Future; Expected date: 07/31/2023    Fatigue, unspecified type  -     CBC Auto Differential; Future; Expected date: 07/31/2023  -     Iron and TIBC; Future; Expected date: 07/31/2023  -     FERRITIN; Future; Expected date: 07/31/2023    Menopausal symptoms        As above, continue current medications and maintain follow up with specialists.  Return to clinic  as needed.    Greater than 50% of visit was spent face to face with patient.  All questions were answered to patient's satisfaction.            Karen L Spencer, NP-C [facility]

## 2023-07-31 ENCOUNTER — OFFICE VISIT (OUTPATIENT)
Dept: PRIMARY CARE CLINIC | Facility: CLINIC | Age: 53
End: 2023-07-31
Payer: COMMERCIAL

## 2023-07-31 ENCOUNTER — HOSPITAL ENCOUNTER (OUTPATIENT)
Dept: RADIOLOGY | Facility: OTHER | Age: 53
Discharge: HOME | End: 2023-07-31
Attending: OBSTETRICS & GYNECOLOGY
Payer: COMMERCIAL

## 2023-07-31 VITALS
OXYGEN SATURATION: 98 % | SYSTOLIC BLOOD PRESSURE: 130 MMHG | WEIGHT: 168.19 LBS | TEMPERATURE: 98 F | HEIGHT: 64 IN | BODY MASS INDEX: 28.71 KG/M2 | HEART RATE: 69 BPM | DIASTOLIC BLOOD PRESSURE: 88 MMHG

## 2023-07-31 DIAGNOSIS — Z12.31 SCREENING MAMMOGRAM, ENCOUNTER FOR: ICD-10-CM

## 2023-07-31 DIAGNOSIS — N95.1 MENOPAUSAL SYMPTOMS: ICD-10-CM

## 2023-07-31 DIAGNOSIS — R53.83 FATIGUE, UNSPECIFIED TYPE: ICD-10-CM

## 2023-07-31 DIAGNOSIS — D64.9 ANEMIA, UNSPECIFIED TYPE: Primary | ICD-10-CM

## 2023-07-31 PROCEDURE — 99203 OFFICE O/P NEW LOW 30 MIN: CPT | Mod: S$GLB,,, | Performed by: NURSE PRACTITIONER

## 2023-07-31 PROCEDURE — 77067 SCR MAMMO BI INCL CAD: CPT | Mod: 26,,, | Performed by: RADIOLOGY

## 2023-07-31 PROCEDURE — 3044F HG A1C LEVEL LT 7.0%: CPT | Mod: CPTII,S$GLB,, | Performed by: NURSE PRACTITIONER

## 2023-07-31 PROCEDURE — 3008F BODY MASS INDEX DOCD: CPT | Mod: CPTII,S$GLB,, | Performed by: NURSE PRACTITIONER

## 2023-07-31 PROCEDURE — 3079F PR MOST RECENT DIASTOLIC BLOOD PRESSURE 80-89 MM HG: ICD-10-PCS | Mod: CPTII,S$GLB,, | Performed by: NURSE PRACTITIONER

## 2023-07-31 PROCEDURE — 99999 PR PBB SHADOW E&M-EST. PATIENT-LVL IV: ICD-10-PCS | Mod: PBBFAC,,, | Performed by: NURSE PRACTITIONER

## 2023-07-31 PROCEDURE — 3008F PR BODY MASS INDEX (BMI) DOCUMENTED: ICD-10-PCS | Mod: CPTII,S$GLB,, | Performed by: NURSE PRACTITIONER

## 2023-07-31 PROCEDURE — 99999 PR PBB SHADOW E&M-EST. PATIENT-LVL IV: CPT | Mod: PBBFAC,,, | Performed by: NURSE PRACTITIONER

## 2023-07-31 PROCEDURE — 3075F PR MOST RECENT SYSTOLIC BLOOD PRESS GE 130-139MM HG: ICD-10-PCS | Mod: CPTII,S$GLB,, | Performed by: NURSE PRACTITIONER

## 2023-07-31 PROCEDURE — 1159F PR MEDICATION LIST DOCUMENTED IN MEDICAL RECORD: ICD-10-PCS | Mod: CPTII,S$GLB,, | Performed by: NURSE PRACTITIONER

## 2023-07-31 PROCEDURE — 99203 PR OFFICE/OUTPT VISIT, NEW, LEVL III, 30-44 MIN: ICD-10-PCS | Mod: S$GLB,,, | Performed by: NURSE PRACTITIONER

## 2023-07-31 PROCEDURE — 3075F SYST BP GE 130 - 139MM HG: CPT | Mod: CPTII,S$GLB,, | Performed by: NURSE PRACTITIONER

## 2023-07-31 PROCEDURE — 3044F PR MOST RECENT HEMOGLOBIN A1C LEVEL <7.0%: ICD-10-PCS | Mod: CPTII,S$GLB,, | Performed by: NURSE PRACTITIONER

## 2023-07-31 PROCEDURE — 77067 MAMMO DIGITAL SCREENING BILAT WITH TOMO: ICD-10-PCS | Mod: 26,,, | Performed by: RADIOLOGY

## 2023-07-31 PROCEDURE — 77067 SCR MAMMO BI INCL CAD: CPT | Mod: TC

## 2023-07-31 PROCEDURE — 77063 MAMMO DIGITAL SCREENING BILAT WITH TOMO: ICD-10-PCS | Mod: 26,,, | Performed by: RADIOLOGY

## 2023-07-31 PROCEDURE — 1159F MED LIST DOCD IN RCRD: CPT | Mod: CPTII,S$GLB,, | Performed by: NURSE PRACTITIONER

## 2023-07-31 PROCEDURE — 3079F DIAST BP 80-89 MM HG: CPT | Mod: CPTII,S$GLB,, | Performed by: NURSE PRACTITIONER

## 2023-07-31 PROCEDURE — 77063 BREAST TOMOSYNTHESIS BI: CPT | Mod: 26,,, | Performed by: RADIOLOGY

## 2024-03-27 DIAGNOSIS — H93.299 ABNORMAL AUDITORY PERCEPTION, UNSPECIFIED LATERALITY: Primary | ICD-10-CM

## 2024-04-01 ENCOUNTER — CLINICAL SUPPORT (OUTPATIENT)
Dept: OTOLARYNGOLOGY | Facility: CLINIC | Age: 54
End: 2024-04-01
Payer: COMMERCIAL

## 2024-04-01 DIAGNOSIS — L29.9 ITCHING OF EAR: ICD-10-CM

## 2024-04-01 DIAGNOSIS — H90.71 MIXED CONDUCTIVE AND SENSORINEURAL HEARING LOSS OF RIGHT EAR WITH UNRESTRICTED HEARING OF LEFT EAR: Primary | ICD-10-CM

## 2024-04-01 DIAGNOSIS — Z82.2 FAMILY HISTORY OF HEARING LOSS: ICD-10-CM

## 2024-04-01 DIAGNOSIS — R94.120 ABNORMALLY COMPLIANT LEFT MIDDLE EAR SYSTEM WITH TYPE AD TYMPANOGRAM CURVE: ICD-10-CM

## 2024-04-01 DIAGNOSIS — R29.2 ABNORMAL ACOUSTIC REFLEX: ICD-10-CM

## 2024-04-01 PROCEDURE — 92550 TYMPANOMETRY & REFLEX THRESH: CPT | Mod: S$GLB,,, | Performed by: AUDIOLOGIST-HEARING AID FITTER

## 2024-04-01 PROCEDURE — 92557 COMPREHENSIVE HEARING TEST: CPT | Mod: S$GLB,,, | Performed by: AUDIOLOGIST-HEARING AID FITTER

## 2024-04-01 NOTE — PROGRESS NOTES
Moreno Drummond, CCC-A  Audiologist - [facility] [address] [place] [address]  [email or web address]  [phone]    Patient: Eva Hair   MRN: [id number]  [address] [facility]   Home Phone [phone]   Work Phone Not on file.   Mobile [phone]   : 1970  GUILLAUME: 2024      AUDIOLOGICAL EVALUATION      RECOMMENDATIONS:   It is recommended that Eva Hair:  Follow up medically with Dr. Pak.  Continue to receive audiological monitoring annually.  Use precaution and/or hearing protection in noisy environments.    If you should have any questions or concerns regarding the above information, please do not hesitate to contact me at [phone].      _______________________________  Moreno Drummond, QUINN-A  Audiologist

## 2024-04-01 NOTE — Clinical Note
Your patient, Eva Hair, was recently seen for an audiogram.  My assessment and recommendations are enclosed.  If you should have any questions or concerns, please contact me at [phone].   Sincerely, Moreno Drummond, CCC-A Audiologist [facility]

## 2024-04-07 NOTE — PROGRESS NOTES
Subjective:       Patient ID: Eva Hair is a 53 y.o. female.    Chief Complaint: No chief complaint on file.      The patient is coming in for evaluation of multiple problems as follows:  1. Tonsilliths:  The patient is having tonsillith formation at least 1 time per month.  When she has tonsilliths she has some mild discomfort but severe halitosis.  2. Allergic rhinitis:  The patient is having clear nasal secretions intermittent nasal blockage in rhinorrhea.  She does use Claritin and fluticasone sprays every day.  3. Asthma:  The patient is having issues with intermittent asthma.  She is currently using no medications but when is she is having problems will take Symbicort 160/0.5 and uses albuterol HFA inhaler as a rescue device.  4. Allergic conjunctivitis:  The patient does use olopatadine ophthalmic drops on an as-needed basis.  5. Hearing loss:  The patient is coming in for results of her recent audiogram.  She does not perceive a difference in the hearing between her 2 ears.            Review of Systems     Constitutional: Positive for fatigue.  Negative for appetite change, chills, fever and unexpected weight loss.      HENT: Positive for postnasal drip, runny nose, sinus pressure, sore throat, stuffy nose and tonsil infection (chronic tonsillith formation).  Negative for ear discharge, ear infection, ear pain (chronic itching in both ears), facial swelling, hearing loss, mouth sores, nosebleeds, ringing in the ears, sinus infection, dental problems, trouble swallowing and voice change.      Eyes:  Positive for eye drainage and eye itching. Negative for change in eyesight and photophobia.     Respiratory:  Positive for cough, shortness of breath and wheezing. Negative for sleep apnea and snoring.      Cardiovascular:  Negative for chest pain, foot swelling, irregular heartbeat and swollen veins.     Gastrointestinal:  Negative for abdominal pain, acid reflux, constipation, diarrhea, heartburn and  vomiting.     Genitourinary: Negative for difficulty urinating, sexual problems and frequent urination.     Musc: Negative for aching joints, aching muscles, back pain and neck pain.     Skin: Positive for rash.     Allergy: Positive for seasonal allergies. Negative for food allergies.     Endocrine: Negative for cold intolerance and heat intolerance.      Neurological: Positive for headaches. Negative for dizziness, light-headedness, seizures and tremors.      Hematologic: Negative for bruises/bleeds easily.      Psychiatric: Negative for decreased concentration, depression, nervous/anxious and sleep disturbance.                Objective:      Physical Exam  Vitals and nursing note reviewed.   Constitutional:       General: She is awake.      Appearance: Normal appearance. She is well-developed, well-groomed and normal weight.   HENT:      Head: Normocephalic.      Jaw: There is normal jaw occlusion.      Salivary Glands: Right salivary gland is not diffusely enlarged or tender. Left salivary gland is not diffusely enlarged or tender.      Right Ear: Hearing, ear canal and external ear normal. No drainage or swelling (ear canal skin mildly inflamed). Tympanic membrane is retracted.      Left Ear: Hearing, ear canal and external ear normal. No drainage or swelling (ear canal skin mildly inflamed). Tympanic membrane is retracted.      Nose: Septal deviation (to the left), mucosal edema (cyanotic, boggy inferior turbinates bilaterally) and rhinorrhea (clear mucus bilaterally) present. No nasal deformity. Rhinorrhea is clear.      Right Turbinates: Enlarged and pale.      Left Turbinates: Enlarged and pale.      Mouth/Throat:      Lips: No lesions.      Mouth: No oral lesions.      Dentition: No gum lesions.      Tongue: No lesions.      Palate: No mass and lesions.      Pharynx: Oropharynx is clear. Uvula midline.      Tonsils: 3+ on the right. 3+ on the left.      Comments: Tonsils 3+/4+ enlarged with deep crypts and  debris in crypts  Eyes:      General: Lids are normal. Vision grossly intact.         Right eye: No discharge.         Left eye: No discharge.      Extraocular Movements: Extraocular movements intact.      Conjunctiva/sclera: Conjunctivae normal.      Pupils: Pupils are equal, round, and reactive to light.   Neck:      Thyroid: No thyroid mass or thyromegaly.      Trachea: Trachea normal. No tracheal deviation.   Cardiovascular:      Rate and Rhythm: Normal rate and regular rhythm.      Pulses: Normal pulses.      Heart sounds: Normal heart sounds.   Pulmonary:      Effort: Pulmonary effort is normal.      Breath sounds: Normal breath sounds. No stridor. No decreased breath sounds, wheezing, rhonchi or rales.   Abdominal:      General: Bowel sounds are normal.      Palpations: Abdomen is soft.      Tenderness: There is no abdominal tenderness.   Musculoskeletal:         General: Normal range of motion.      Cervical back: Normal range of motion and neck supple. No muscular tenderness.   Lymphadenopathy:      Head:      Right side of head: No submental, submandibular, preauricular, posterior auricular or occipital adenopathy.      Left side of head: No submental, submandibular, preauricular, posterior auricular or occipital adenopathy.      Cervical: No cervical adenopathy.   Skin:     General: Skin is warm and dry.      Findings: No petechiae or rash.      Nails: There is no clubbing.   Neurological:      Mental Status: She is alert and oriented to person, place, and time.      Cranial Nerves: No cranial nerve deficit.      Sensory: No sensory deficit.      Gait: Gait normal.   Psychiatric:         Speech: Speech normal.         Behavior: Behavior normal. Behavior is cooperative.         Thought Content: Thought content normal.         Judgment: Judgment normal.         Pure tone audiology evaluation performed 04/01/2024 below:    I personally reviewed the above audiogram and discussed in full detail with the  patient during her visit.  I answered all of her questions and offered her a copy of the audiogram.  Pertinent findings:  Mild sensorineural hearing loss right ear with normal hearing in the left ear, normal speech audiometry at mildly elevated hearing levels, tympanograms are Type A in the right ear andType Ad in the left with negative pressure in both ears but still within normal limits    Assessment:       1. Mixed conductive and sensorineural hearing loss of right ear with unrestricted hearing of left ear    2. Tonsillolith    3. Non-seasonal allergic rhinitis, unspecified trigger    4. Mild intermittent asthma without complication    5. Nasal septal deviation    6. Chronic eczematous otitis externa of both ears        Plan:       I will have the patient use saline gargles twice weekly routinely.  We discussed that the other option was removal of the tonsils.  I will recheck her in 3 months.  I am giving her information on tonsillectomy, postoperative tonsillectomy care and diet for general information.  Regarding the conductive hearing loss I have offered referral to an otologist.  Placement of a hearing aid in the right ear would equalize the hearing.  The patient does not want to proceed with either these options currently.                DISCLAIMER: This note was prepared with [employer] voice recognition transcription software. Garbled syntax, mangled pronouns, and other bizarre constructions may be attributed to that software system. While efforts were made to correct any mistakes made by this voice recognition program, some errors and/or omissions may remain in the note that were missed when the note was originally created.

## 2024-04-08 ENCOUNTER — OFFICE VISIT (OUTPATIENT)
Dept: OTOLARYNGOLOGY | Facility: CLINIC | Age: 54
End: 2024-04-08
Payer: COMMERCIAL

## 2024-04-08 DIAGNOSIS — J35.8 TONSILLOLITH: ICD-10-CM

## 2024-04-08 DIAGNOSIS — J45.20 MILD INTERMITTENT ASTHMA WITHOUT COMPLICATION: ICD-10-CM

## 2024-04-08 DIAGNOSIS — H60.8X3 CHRONIC ECZEMATOUS OTITIS EXTERNA OF BOTH EARS: ICD-10-CM

## 2024-04-08 DIAGNOSIS — H90.71 MIXED CONDUCTIVE AND SENSORINEURAL HEARING LOSS OF RIGHT EAR WITH UNRESTRICTED HEARING OF LEFT EAR: Primary | ICD-10-CM

## 2024-04-08 DIAGNOSIS — J34.2 NASAL SEPTAL DEVIATION: ICD-10-CM

## 2024-04-08 DIAGNOSIS — J30.89 NON-SEASONAL ALLERGIC RHINITIS, UNSPECIFIED TRIGGER: ICD-10-CM

## 2024-04-08 PROCEDURE — 1160F RVW MEDS BY RX/DR IN RCRD: CPT | Mod: CPTII,S$GLB,, | Performed by: SPECIALIST

## 2024-04-08 PROCEDURE — 99999 PR PBB SHADOW E&M-EST. PATIENT-LVL II: CPT | Mod: PBBFAC,,, | Performed by: SPECIALIST

## 2024-04-08 PROCEDURE — 1159F MED LIST DOCD IN RCRD: CPT | Mod: CPTII,S$GLB,, | Performed by: SPECIALIST

## 2024-04-08 PROCEDURE — 99214 OFFICE O/P EST MOD 30 MIN: CPT | Mod: S$GLB,,, | Performed by: SPECIALIST

## 2024-04-08 RX ORDER — FLUOCINOLONE ACETONIDE 0.11 MG/ML
3 OIL AURICULAR (OTIC) 2 TIMES DAILY
Qty: 20 ML | Refills: 2 | Status: SHIPPED | OUTPATIENT
Start: 2024-04-08

## 2024-07-26 ENCOUNTER — OFFICE VISIT (OUTPATIENT)
Dept: OBSTETRICS AND GYNECOLOGY | Facility: CLINIC | Age: 54
End: 2024-07-26
Payer: COMMERCIAL

## 2024-07-26 ENCOUNTER — LAB VISIT (OUTPATIENT)
Dept: LAB | Facility: HOSPITAL | Age: 54
End: 2024-07-26
Attending: OBSTETRICS & GYNECOLOGY
Payer: COMMERCIAL

## 2024-07-26 VITALS
BODY MASS INDEX: 28.79 KG/M2 | DIASTOLIC BLOOD PRESSURE: 88 MMHG | HEIGHT: 64 IN | SYSTOLIC BLOOD PRESSURE: 126 MMHG | WEIGHT: 168.63 LBS

## 2024-07-26 DIAGNOSIS — Z12.31 SCREENING MAMMOGRAM, ENCOUNTER FOR: ICD-10-CM

## 2024-07-26 DIAGNOSIS — D64.9 ANEMIA, UNSPECIFIED TYPE: ICD-10-CM

## 2024-07-26 DIAGNOSIS — Z11.3 SCREEN FOR STD (SEXUALLY TRANSMITTED DISEASE): ICD-10-CM

## 2024-07-26 DIAGNOSIS — E05.90 SUBCLINICAL HYPERTHYROIDISM: ICD-10-CM

## 2024-07-26 DIAGNOSIS — Z01.419 WELL WOMAN EXAM WITH ROUTINE GYNECOLOGICAL EXAM: Primary | ICD-10-CM

## 2024-07-26 LAB
BASOPHILS # BLD AUTO: 0.07 K/UL (ref 0–0.2)
BASOPHILS NFR BLD: 1.1 % (ref 0–1.9)
DIFFERENTIAL METHOD BLD: ABNORMAL
EOSINOPHIL # BLD AUTO: 0.1 K/UL (ref 0–0.5)
EOSINOPHIL NFR BLD: 2.1 % (ref 0–8)
ERYTHROCYTE [DISTWIDTH] IN BLOOD BY AUTOMATED COUNT: 12.8 % (ref 11.5–14.5)
HAV IGM SERPL QL IA: NORMAL
HBV CORE IGM SERPL QL IA: NORMAL
HBV SURFACE AG SERPL QL IA: NORMAL
HCT VFR BLD AUTO: 33.7 % (ref 37–48.5)
HCV AB SERPL QL IA: NORMAL
HGB BLD-MCNC: 10.9 G/DL (ref 12–16)
HIV 1+2 AB+HIV1 P24 AG SERPL QL IA: NORMAL
IMM GRANULOCYTES # BLD AUTO: 0.01 K/UL (ref 0–0.04)
IMM GRANULOCYTES NFR BLD AUTO: 0.2 % (ref 0–0.5)
IRON SERPL-MCNC: 71 UG/DL (ref 30–160)
LYMPHOCYTES # BLD AUTO: 2.2 K/UL (ref 1–4.8)
LYMPHOCYTES NFR BLD: 34.8 % (ref 18–48)
MCH RBC QN AUTO: 31.3 PG (ref 27–31)
MCHC RBC AUTO-ENTMCNC: 32.3 G/DL (ref 32–36)
MCV RBC AUTO: 97 FL (ref 82–98)
MONOCYTES # BLD AUTO: 0.6 K/UL (ref 0.3–1)
MONOCYTES NFR BLD: 9.1 % (ref 4–15)
NEUTROPHILS # BLD AUTO: 3.3 K/UL (ref 1.8–7.7)
NEUTROPHILS NFR BLD: 52.7 % (ref 38–73)
NRBC BLD-RTO: 0 /100 WBC
PLATELET # BLD AUTO: 245 K/UL (ref 150–450)
PMV BLD AUTO: 10.7 FL (ref 9.2–12.9)
RBC # BLD AUTO: 3.48 M/UL (ref 4–5.4)
SATURATED IRON: 19 % (ref 20–50)
T4 FREE SERPL-MCNC: 0.96 NG/DL (ref 0.71–1.51)
TOTAL IRON BINDING CAPACITY: 374 UG/DL (ref 250–450)
TRANSFERRIN SERPL-MCNC: 253 MG/DL (ref 200–375)
TREPONEMA PALLIDUM IGG+IGM AB [PRESENCE] IN SERUM OR PLASMA BY IMMUNOASSAY: NONREACTIVE
TSH SERPL DL<=0.005 MIU/L-ACNC: 0.38 UIU/ML (ref 0.4–4)
WBC # BLD AUTO: 6.26 K/UL (ref 3.9–12.7)

## 2024-07-26 PROCEDURE — 84466 ASSAY OF TRANSFERRIN: CPT | Performed by: OBSTETRICS & GYNECOLOGY

## 2024-07-26 PROCEDURE — 80074 ACUTE HEPATITIS PANEL: CPT | Performed by: OBSTETRICS & GYNECOLOGY

## 2024-07-26 PROCEDURE — 87389 HIV-1 AG W/HIV-1&-2 AB AG IA: CPT | Performed by: OBSTETRICS & GYNECOLOGY

## 2024-07-26 PROCEDURE — 84443 ASSAY THYROID STIM HORMONE: CPT | Performed by: OBSTETRICS & GYNECOLOGY

## 2024-07-26 PROCEDURE — 83540 ASSAY OF IRON: CPT | Performed by: OBSTETRICS & GYNECOLOGY

## 2024-07-26 PROCEDURE — 84439 ASSAY OF FREE THYROXINE: CPT | Performed by: OBSTETRICS & GYNECOLOGY

## 2024-07-26 PROCEDURE — 36415 COLL VENOUS BLD VENIPUNCTURE: CPT | Mod: PN | Performed by: OBSTETRICS & GYNECOLOGY

## 2024-07-26 PROCEDURE — 99999 PR PBB SHADOW E&M-EST. PATIENT-LVL III: CPT | Mod: PBBFAC,,, | Performed by: OBSTETRICS & GYNECOLOGY

## 2024-07-26 PROCEDURE — 86593 SYPHILIS TEST NON-TREP QUANT: CPT | Performed by: OBSTETRICS & GYNECOLOGY

## 2024-07-26 PROCEDURE — 85025 COMPLETE CBC W/AUTO DIFF WBC: CPT | Performed by: OBSTETRICS & GYNECOLOGY

## 2024-07-26 NOTE — PROGRESS NOTES
"History & Physical  Gynecology      SUBJECTIVE:     Chief Complaint: Gynecologic Exam         History of Present Illness:    Eva Hair is a 54 y.o. female  here for annual routine Pap and checkup. No LMP recorded. Patient has had a hysterectomy.  Pt has hx of RICKIE/BS for AUB in .  Pt reports "musky" smell vaginal odor.  No discharge.  Feels like it is similar to axilla body odor.    Hot flashes less frequent.  Not on paxil  denies break through bleeding.   denies vaginal itching or irritation.    denies vaginal discharge    She is sexually active with 1 partner    History of abnormal pap: No  Last MMG: yes, 2023  Last Colonoscopy:  3/6/2023, repeat in 10 years.        Review of patient's allergies indicates:   Allergen Reactions    Cat/feline products     Grass pollen-red top, standard     Iodine and iodide containing products Swelling    Milk containing products (dairy)      Whole Milk - Headache    Oxycodone Other (See Comments)     Causes hallucinations    Pecan nut      Headache    Pork/porcine containing products Other (See Comments)     Headache       Past Medical History:   Diagnosis Date    Eczema 2018    Environmental and seasonal allergies 2018    Mild persistent asthma without complication 2018     Past Surgical History:   Procedure Laterality Date     SECTION      x 2    COLONOSCOPY N/A 3/6/2023    Procedure: COLONOSCOPY;  Surgeon: Emory Flores MD;  Location: [facility] ENDOSCOPY;  Service: Endoscopy;  Laterality: N/A;  instructions via portal - r/s-has peg prep-tb  pt needs a early Monday appt    HEMORRHOID SURGERY      HYSTERECTOMY      ovaries intact     OB History          2    Para   2    Term   2            AB        Living             SAB        IAB        Ectopic        Multiple        Live Births                   Family History   Problem Relation Name Age of Onset    Arthritis Mother      Gout Mother      Hypertension Father      " Cerebral aneurysm Father  72    Breast cancer Sister Sylvia 56    No Known Problems Sister Jacqui         1/2 sister    Asthma Brother Wilder     Hypertension Brother Wilder     No Known Problems Brother Colt     Gout Brother Geronimo     No Known Problems Brother Andrew         1/2 brother    No Known Problems Brother Geronimo         1/2 brother    Diabetes Maternal Grandmother      Diabetes Maternal Grandfather      No Known Problems Paternal Grandmother      No Known Problems Paternal Grandfather      Ovarian cancer Maternal Aunt Onelia     Breast cancer Maternal Aunt Alexandria         dx in 50s    No Known Problems Daughter Reagan     No Known Problems Son Maria Isabel      Social History     Tobacco Use    Smoking status: Never    Smokeless tobacco: Never   Substance Use Topics    Alcohol use: Yes     Comment: 2x/year    Drug use: No       Current Outpatient Medications   Medication Sig    albuterol (PROVENTIL/VENTOLIN HFA) 90 mcg/actuation inhaler Inhale 2 puffs into the lungs once daily. Rescue    azelastine (ASTELIN) 137 mcg (0.1 %) nasal spray 1 spray by Nasal route daily as needed.    budesonide-formoterol 160-4.5 mcg (SYMBICORT) 160-4.5 mcg/actuation HFAA Inhale 2 puffs into the lungs once daily. Controller    eszopiclone (LUNESTA) 2 MG Tab     fluocinolone acetonide oiL (DERMOTIC OIL) 0.01 % Drop Place 3 drops in ear(s) 2 (two) times daily.    FLUoxetine 40 MG capsule 1 CAP ORALLY ONCE DAILY.    fluticasone propionate (FLONASE) 50 mcg/actuation nasal spray APPLY 1 SPRAY INTO EACH NOSTRIL TWICE A DAY    ibuprofen (ADVIL,MOTRIN) 800 MG tablet Take 800 mg by mouth 3 (three) times daily.    loratadine (CLARITIN) 10 mg tablet Take 10 mg by mouth once daily.    olopatadine (PATANOL) 0.1 % ophthalmic solution INSTILL 2 DROPS INTO BOTH EARS TWICE A DAY    pimecrolimus (ELIDEL) 1 % cream      No current facility-administered medications for this visit.         Review of Systems:  Review of Systems   Constitutional:   Negative for activity change, appetite change, chills, fatigue, fever and unexpected weight change.   Respiratory:  Negative for cough, shortness of breath and wheezing.    Cardiovascular:  Negative for chest pain and leg swelling.   Gastrointestinal:  Negative for abdominal pain, blood in stool, constipation, diarrhea, nausea and vomiting.   Endocrine: Negative for diabetes, hair loss and hot flashes.   Genitourinary:  Negative for decreased libido, dyspareunia, dysuria, frequency, menstrual problem, pelvic pain, vaginal bleeding, vaginal discharge, vaginal pain, postcoital bleeding and postmenopausal bleeding.   Musculoskeletal:  Negative for back pain.   Integumentary:  Negative for acne, hair changes, breast mass, nipple discharge and breast skin changes.   Neurological:  Negative for headaches.   Psychiatric/Behavioral:  Negative for sleep disturbance. The patient is not nervous/anxious.    Breast: Negative for mass, mastodynia, nipple discharge and skin changes       OBJECTIVE:     Physical Exam:  Physical Exam  Constitutional:       Appearance: She is well-developed.   HENT:      Head: Normocephalic and atraumatic.   Eyes:      General: No scleral icterus.        Right eye: No discharge.         Left eye: No discharge.      Conjunctiva/sclera: Conjunctivae normal.   Pulmonary:      Effort: Pulmonary effort is normal.      Breath sounds: No stridor.   Chest:      Chest wall: No mass or tenderness.   Breasts:     Breasts are symmetrical.      Right: No inverted nipple, mass, nipple discharge, skin change or tenderness.      Left: No inverted nipple, mass, nipple discharge, skin change or tenderness.   Abdominal:      General: There is no distension.      Palpations: Abdomen is soft.      Tenderness: There is no abdominal tenderness.   Genitourinary:     Labia:         Right: No rash, tenderness, lesion or injury.         Left: No rash, tenderness, lesion or injury.       Vagina: Normal.      Cervix: No cervical  motion tenderness, discharge or friability.      Adnexa:         Right: No mass, tenderness or fullness.          Left: No mass, tenderness or fullness.        Comments: Normal external genitalia.  Normal hair distribution.  Urethral meatus normal. Uterus, cervix surgically absent.  No adnexal masses or tenderness. Vaginal mucosa with mild atrophy but otherwise normal.  Cuff intact  Musculoskeletal:         General: Normal range of motion.   Skin:     General: Skin is warm and dry.   Neurological:      Mental Status: She is alert and oriented to person, place, and time.   Psychiatric:         Behavior: Behavior normal.         Thought Content: Thought content normal.         Judgment: Judgment normal.           ASSESSMENT:       ICD-10-CM ICD-9-CM    1. Well woman exam with routine gynecological exam  Z01.419 V72.31       2. Screening mammogram, encounter for  Z12.31 V76.12 Mammo Digital Screening Bilat w/ Nawaf      3. Screen for STD (sexually transmitted disease)  Z11.3 V74.5 HIV 1/2 Ag/Ab (4th Gen)      Treponema Pallidium Antibodies IgG, IgM      Hepatitis Panel, Acute      4. Subclinical hyperthyroidism  E05.90 242.90 TSH      T4, Free      5. Anemia, unspecified type  D64.9 285.9 CBC Auto Differential      Iron and TIBC               Plan:      Eva was seen today for gynecologic exam.    Diagnoses and all orders for this visit:    Well woman exam with routine gynecological exam  - pap smears no longer indicated  - MMG ordered  - Cscope up to date    Screening mammogram, encounter for  -     Mammo Digital Screening Bilat w/ Nawaf; Future    Screen for STD (sexually transmitted disease)  -     HIV 1/2 Ag/Ab (4th Gen); Future  -     Treponema Pallidium Antibodies IgG, IgM; Future  -     Hepatitis Panel, Acute; Future    Subclinical hyperthyroidism  -     TSH; Future  -     T4, Free; Future    Anemia, unspecified type  -     CBC Auto Differential; Future  -     Iron and TIBC; Future        Orders Placed This  Encounter   Procedures    Mammo Digital Screening Bilat w/ Nawaf    TSH    T4, Free    HIV 1/2 Ag/Ab (4th Gen)    Treponema Pallidium Antibodies IgG, IgM    Hepatitis Panel, Acute    CBC Auto Differential    Iron and TIBC       No follow-ups on file.       Counseling time: 15 minutes    Kiara Love

## 2024-07-29 ENCOUNTER — HOSPITAL ENCOUNTER (OUTPATIENT)
Dept: RADIOLOGY | Facility: OTHER | Age: 54
Discharge: HOME | End: 2024-07-29
Attending: OBSTETRICS & GYNECOLOGY
Payer: COMMERCIAL

## 2024-07-29 DIAGNOSIS — Z12.31 SCREENING MAMMOGRAM, ENCOUNTER FOR: ICD-10-CM

## 2024-07-29 PROCEDURE — 77067 SCR MAMMO BI INCL CAD: CPT | Mod: TC

## 2024-07-29 PROCEDURE — 77063 BREAST TOMOSYNTHESIS BI: CPT | Mod: TC

## 2024-09-09 NOTE — PROGRESS NOTES
[facility] Note    Chief Complaint      Chief Complaint   Patient presents with    Back Pain    legs are heavy    Heartburn       History of Present Illness      Eva Hair is a 54 y.o. female who presents today for   Chief Complaint   Patient presents with    Back Pain    legs are heavy    Heartburn         Patient is known to me.  She presents to clinic today to discuss back pain and leg pain.  She reports falling a proximally 10 days ago.  She was holding her grandchild and was guarding her against falling on the floor.  She did not get x-rays after nor has she sought treatment immediately after incident.  She reports mid and lower back pain which has been worsening since the incident.  She also reports that when she gets out of bed her legs feel heavy.  She denies any shortness a breath or chest pain at this time.  There are no other complaints at this time.         Review of Systems   Musculoskeletal:  Positive for back pain.   All 12 systems otherwise negative.       Family History:  family history includes Arthritis in her mother; Asthma in her brother; Breast cancer in her maternal aunt; Breast cancer (age of onset: 56) in her sister; Cerebral aneurysm (age of onset: 72) in her father; Diabetes in her maternal grandfather and maternal grandmother; Gout in her brother and mother; Hypertension in her brother and father; No Known Problems in her brother, brother, brother, daughter, paternal grandfather, paternal grandmother, sister, and son; Ovarian cancer in her maternal aunt.   Family history was reviewed with patient.     Medications:  Outpatient Encounter Medications as of 9/30/2024   Medication Sig Dispense Refill    albuterol (PROVENTIL/VENTOLIN HFA) 90 mcg/actuation inhaler Inhale 2 puffs into the lungs once daily. Rescue      azelastine (ASTELIN) 137 mcg (0.1 %) nasal spray 1 spray by Nasal route daily as needed.  4    budesonide-formoterol 160-4.5 mcg (SYMBICORT) 160-4.5  mcg/actuation HFAA Inhale 2 puffs into the lungs once daily. Controller 10.2 g 5    eszopiclone (LUNESTA) 2 MG Tab       fluocinolone acetonide oiL (DERMOTIC OIL) 0.01 % Drop Place 3 drops in ear(s) 2 (two) times daily. 20 mL 2    fluticasone propionate (FLONASE) 50 mcg/actuation nasal spray APPLY 1 SPRAY INTO EACH NOSTRIL TWICE A DAY      loratadine (CLARITIN) 10 mg tablet Take 10 mg by mouth once daily.      olopatadine (PATANOL) 0.1 % ophthalmic solution INSTILL 2 DROPS INTO BOTH EARS TWICE A DAY  4    [DISCONTINUED] ibuprofen (ADVIL,MOTRIN) 800 MG tablet Take 800 mg by mouth 3 (three) times daily.      cyclobenzaprine (FLEXERIL) 10 MG tablet Take 1 tablet (10 mg total) by mouth 3 (three) times daily as needed for Muscle spasms. 30 tablet 0    FLUoxetine 40 MG capsule 1 CAP ORALLY ONCE DAILY. (Patient not taking: Reported on 9/30/2024)      ibuprofen (ADVIL,MOTRIN) 800 MG tablet Take 1 tablet (800 mg total) by mouth 3 (three) times daily. 90 tablet 0    pantoprazole (PROTONIX) 40 MG tablet Take 1 tablet (40 mg total) by mouth once daily. 90 tablet 3    triamcinolone acetonide 0.025% (KENALOG) 0.025 % cream Apply topically 2 (two) times daily. 1 each 0    [DISCONTINUED] pimecrolimus (ELIDEL) 1 % cream  (Patient not taking: Reported on 9/30/2024)       No facility-administered encounter medications on file as of 9/30/2024.       Allergies:  Review of patient's allergies indicates:   Allergen Reactions    Cat/feline products     Grass pollen-red top, standard     Iodine and iodide containing products Swelling    Milk containing products (dairy)      Whole Milk - Headache    Oxycodone Other (See Comments)     Causes hallucinations    Pecan nut      Headache    Pork/porcine containing products Other (See Comments)     Headache       Health Maintenance:  Health Maintenance   Topic Date Due    Mammogram  07/29/2025    Lipid Panel  07/21/2028    TETANUS VACCINE  11/13/2032    Colorectal Cancer Screening  03/06/2033     "Hepatitis C Screening  Completed    Shingles Vaccine  Completed     Health Maintenance Topics with due status: Not Due       Topic Last Completion Date    TETANUS VACCINE 11/13/2022    Colorectal Cancer Screening 03/06/2023    Hemoglobin A1c (Diabetic Prevention Screening) 07/21/2023    Lipid Panel 07/21/2023    Mammogram 07/29/2024    RSV Vaccine (Age 60+ and Pregnant patients) Not Due       Physical Exam      Vital Signs  Pulse: 83  SpO2: 97 %  BP: (!) 130/90  BP Location: Right arm  Patient Position: Sitting  Pain Score:   7  Pain Loc: Back  Height and Weight  Height: 5' 4" (162.6 cm)  Weight: 76.8 kg (169 lb 5 oz)  BSA (Calculated - sq m): 1.86 sq meters  BMI (Calculated): 29  Weight in (lb) to have BMI = 25: 145.3]    Physical Exam  Vitals reviewed.   Constitutional:       Appearance: Normal appearance. She is normal weight.   HENT:      Head: Normocephalic and atraumatic.      Nose: Nose normal.      Mouth/Throat:      Mouth: Mucous membranes are moist.      Pharynx: Oropharynx is clear.   Eyes:      Extraocular Movements: Extraocular movements intact.      Conjunctiva/sclera: Conjunctivae normal.      Pupils: Pupils are equal, round, and reactive to light.   Cardiovascular:      Rate and Rhythm: Normal rate and regular rhythm.      Pulses: Normal pulses.      Heart sounds: Normal heart sounds.   Pulmonary:      Effort: Pulmonary effort is normal.      Breath sounds: Normal breath sounds.   Musculoskeletal:         General: Normal range of motion.      Cervical back: Normal range of motion and neck supple.   Skin:     General: Skin is warm and dry.      Capillary Refill: Capillary refill takes less than 2 seconds.   Neurological:      General: No focal deficit present.      Mental Status: She is alert and oriented to person, place, and time. Mental status is at baseline.   Psychiatric:         Mood and Affect: Mood normal.         Behavior: Behavior normal.         Thought Content: Thought content normal.    "      Judgment: Judgment normal.            Assessment/Plan     Eva Hair is a 54 y.o.female with:    Back pain, unspecified back location, unspecified back pain laterality, unspecified chronicity  -     X-Ray Lumbar Spine AP And Lateral; Future; Expected date: 09/30/2024  -     X-Ray Thoracic Spine AP Lateral; Future; Expected date: 09/30/2024  -     ibuprofen (ADVIL,MOTRIN) 800 MG tablet; Take 1 tablet (800 mg total) by mouth 3 (three) times daily.  Dispense: 90 tablet; Refill: 0  -     cyclobenzaprine (FLEXERIL) 10 MG tablet; Take 1 tablet (10 mg total) by mouth 3 (three) times daily as needed for Muscle spasms.  Dispense: 30 tablet; Refill: 0  -     Ambulatory referral/consult to [facility]; Future; Expected date: 10/07/2024    Routine medical exam  -     Comprehensive Metabolic Panel; Future; Expected date: 09/30/2024  -     Lipid Panel; Future; Expected date: 09/30/2024  -     Hemoglobin A1C; Future; Expected date: 09/30/2024    Rash  -     triamcinolone acetonide 0.025% (KENALOG) 0.025 % cream; Apply topically 2 (two) times daily.  Dispense: 1 each; Refill: 0    Gastric reflux  -     pantoprazole (PROTONIX) 40 MG tablet; Take 1 tablet (40 mg total) by mouth once daily.  Dispense: 90 tablet; Refill: 3        As above, continue current medications and maintain follow up with specialists.  Return to clinic as needed.    Greater than 50% of visit was spent face to face with patient.  All questions were answered to patient's satisfaction.          Karen L Spencer, NP-C [facility]

## 2024-09-30 ENCOUNTER — HOSPITAL ENCOUNTER (OUTPATIENT)
Dept: RADIOLOGY | Facility: HOSPITAL | Age: 54
Discharge: HOME | End: 2024-09-30
Attending: NURSE PRACTITIONER
Payer: COMMERCIAL

## 2024-09-30 ENCOUNTER — OFFICE VISIT (OUTPATIENT)
Dept: PRIMARY CARE CLINIC | Facility: CLINIC | Age: 54
End: 2024-09-30
Payer: COMMERCIAL

## 2024-09-30 VITALS
HEART RATE: 83 BPM | SYSTOLIC BLOOD PRESSURE: 130 MMHG | HEIGHT: 64 IN | DIASTOLIC BLOOD PRESSURE: 90 MMHG | WEIGHT: 169.31 LBS | OXYGEN SATURATION: 97 % | BODY MASS INDEX: 28.91 KG/M2

## 2024-09-30 DIAGNOSIS — Z00.00 ROUTINE MEDICAL EXAM: ICD-10-CM

## 2024-09-30 DIAGNOSIS — R21 RASH: ICD-10-CM

## 2024-09-30 DIAGNOSIS — M54.9 BACK PAIN, UNSPECIFIED BACK LOCATION, UNSPECIFIED BACK PAIN LATERALITY, UNSPECIFIED CHRONICITY: ICD-10-CM

## 2024-09-30 DIAGNOSIS — K21.9 GASTRIC REFLUX: ICD-10-CM

## 2024-09-30 DIAGNOSIS — M54.9 BACK PAIN, UNSPECIFIED BACK LOCATION, UNSPECIFIED BACK PAIN LATERALITY, UNSPECIFIED CHRONICITY: Primary | ICD-10-CM

## 2024-09-30 PROCEDURE — 72100 X-RAY EXAM L-S SPINE 2/3 VWS: CPT | Mod: TC,PN

## 2024-09-30 PROCEDURE — 72070 X-RAY EXAM THORAC SPINE 2VWS: CPT | Mod: TC,PN

## 2024-09-30 PROCEDURE — 1159F MED LIST DOCD IN RCRD: CPT | Mod: CPTII,S$GLB,, | Performed by: NURSE PRACTITIONER

## 2024-09-30 PROCEDURE — 3075F SYST BP GE 130 - 139MM HG: CPT | Mod: CPTII,S$GLB,, | Performed by: NURSE PRACTITIONER

## 2024-09-30 PROCEDURE — 1160F RVW MEDS BY RX/DR IN RCRD: CPT | Mod: CPTII,S$GLB,, | Performed by: NURSE PRACTITIONER

## 2024-09-30 PROCEDURE — 99214 OFFICE O/P EST MOD 30 MIN: CPT | Mod: S$GLB,,, | Performed by: NURSE PRACTITIONER

## 2024-09-30 PROCEDURE — 99999 PR PBB SHADOW E&M-EST. PATIENT-LVL V: CPT | Mod: PBBFAC,,, | Performed by: NURSE PRACTITIONER

## 2024-09-30 PROCEDURE — 3080F DIAST BP >= 90 MM HG: CPT | Mod: CPTII,S$GLB,, | Performed by: NURSE PRACTITIONER

## 2024-09-30 PROCEDURE — 72100 X-RAY EXAM L-S SPINE 2/3 VWS: CPT | Mod: 26,,, | Performed by: RADIOLOGY

## 2024-09-30 PROCEDURE — 72070 X-RAY EXAM THORAC SPINE 2VWS: CPT | Mod: 26,,, | Performed by: RADIOLOGY

## 2024-09-30 PROCEDURE — 3008F BODY MASS INDEX DOCD: CPT | Mod: CPTII,S$GLB,, | Performed by: NURSE PRACTITIONER

## 2024-09-30 RX ORDER — IBUPROFEN 800 MG/1
800 TABLET ORAL 3 TIMES DAILY
Qty: 90 TABLET | Refills: 0 | Status: SHIPPED | OUTPATIENT
Start: 2024-09-30

## 2024-09-30 RX ORDER — CYCLOBENZAPRINE HCL 10 MG
10 TABLET ORAL 3 TIMES DAILY PRN
Qty: 30 TABLET | Refills: 0 | Status: SHIPPED | OUTPATIENT
Start: 2024-09-30 | End: 2024-10-10

## 2024-09-30 RX ORDER — PANTOPRAZOLE SODIUM 40 MG/1
40 TABLET, DELAYED RELEASE ORAL DAILY
Qty: 90 TABLET | Refills: 3 | Status: SHIPPED | OUTPATIENT
Start: 2024-09-30 | End: 2025-09-30

## 2024-09-30 RX ORDER — TRIAMCINOLONE ACETONIDE 0.25 MG/G
CREAM TOPICAL 2 TIMES DAILY
Qty: 1 EACH | Refills: 0 | Status: SHIPPED | OUTPATIENT
Start: 2024-09-30

## 2024-10-02 NOTE — PROGRESS NOTES
[facility] Note    Chief Complaint      Chief Complaint   Patient presents with    Annual Exam       History of Present Illness      Eva Hair is a 54 y.o. female who presents today for   Chief Complaint   Patient presents with    Annual Exam         Presents to clinic today for her annual medical exam.  Labs were reviewed and questions answered.  Patient reports attending physical therapy for her back pain.  She will be starting physical therapy this week.  She reports having lower extremity pain from her knees to her ankles.  I discussed getting x-rays done on her knees.  Patient verbalizes understanding and agrees with this plan of care.  There are no other complaints this time.         Review of Systems   All 12 systems otherwise negative.       Family History:  family history includes Arthritis in her mother; Asthma in her brother; Breast cancer in her maternal aunt; Breast cancer (age of onset: 56) in her sister; Cerebral aneurysm (age of onset: 72) in her father; Diabetes in her maternal grandfather and maternal grandmother; Gout in her brother and mother; Hypertension in her brother and father; No Known Problems in her brother, brother, brother, daughter, paternal grandfather, paternal grandmother, sister, and son; Ovarian cancer in her maternal aunt.   Family history was reviewed with patient.     Medications:  Outpatient Encounter Medications as of 10/14/2024   Medication Sig Dispense Refill    albuterol (PROVENTIL/VENTOLIN HFA) 90 mcg/actuation inhaler Inhale 2 puffs into the lungs once daily. Rescue 18 g 3    azelastine (ASTELIN) 137 mcg (0.1 %) nasal spray 1 spray by Nasal route daily as needed.  4    budesonide-formoterol 160-4.5 mcg (SYMBICORT) 160-4.5 mcg/actuation HFAA Inhale 2 puffs into the lungs once daily. Controller 10.2 g 5    [] cyclobenzaprine (FLEXERIL) 10 MG tablet Take 1 tablet (10 mg total) by mouth 3 (three) times daily as needed for Muscle spasms. 30 tablet  0    eszopiclone (LUNESTA) 2 MG Tab       fluocinolone acetonide oiL (DERMOTIC OIL) 0.01 % Drop Place 3 drops in ear(s) 2 (two) times daily. 20 mL 2    fluticasone propionate (FLONASE) 50 mcg/actuation nasal spray APPLY 1 SPRAY INTO EACH NOSTRIL TWICE A DAY      ibuprofen (ADVIL,MOTRIN) 800 MG tablet Take 1 tablet (800 mg total) by mouth 3 (three) times daily. 90 tablet 0    loratadine (CLARITIN) 10 mg tablet Take 10 mg by mouth once daily.      methocarbamoL (ROBAXIN) 500 MG Tab Take 1 tablet (500 mg total) by mouth 4 (four) times daily. for 10 days 40 tablet 0    olopatadine (PATANOL) 0.1 % ophthalmic solution INSTILL 2 DROPS INTO BOTH EARS TWICE A DAY  4    pantoprazole (PROTONIX) 40 MG tablet Take 1 tablet (40 mg total) by mouth once daily. 90 tablet 3    triamcinolone acetonide 0.025% (KENALOG) 0.025 % cream Apply topically 2 (two) times daily. 1 each 0    [DISCONTINUED] albuterol (PROVENTIL/VENTOLIN HFA) 90 mcg/actuation inhaler Inhale 2 puffs into the lungs once daily. Rescue      [DISCONTINUED] FLUoxetine 40 MG capsule 1 CAP ORALLY ONCE DAILY. (Patient not taking: Reported on 10/2/2024)      [DISCONTINUED] triamcinolone acetonide 0.025% (KENALOG) 0.025 % cream Apply topically 2 (two) times daily. 1 each 0     No facility-administered encounter medications on file as of 10/14/2024.       Allergies:  Review of patient's allergies indicates:   Allergen Reactions    Cat/feline products     Flexeril [cyclobenzaprine]      headache    Grass pollen-red top, standard     Iodine and iodide containing products Swelling    Milk containing products (dairy)      Whole Milk - Headache    Oxycodone Other (See Comments)     Causes hallucinations    Pecan nut      Headache    Pork/porcine containing products Other (See Comments)     Headache       Health Maintenance:  Health Maintenance   Topic Date Due    Mammogram  07/29/2025    Lipid Panel  09/30/2029    TETANUS VACCINE  11/13/2032    Colorectal Cancer Screening   "03/06/2033    Hepatitis C Screening  Completed    Shingles Vaccine  Completed     Health Maintenance Topics with due status: Not Due       Topic Last Completion Date    TETANUS VACCINE 11/13/2022    Colorectal Cancer Screening 03/06/2023    Mammogram 07/29/2024    Hemoglobin A1c (Diabetic Prevention Screening) 09/30/2024    Lipid Panel 09/30/2024    RSV Vaccine (Age 60+ and Pregnant patients) Not Due       Physical Exam      Vital Signs  Pulse: 80  Resp: 20  SpO2: 98 %  BP: 132/74  Pain Score: 0-No pain  Height and Weight  Height: 5' 4" (162.6 cm)  Weight: 76.2 kg (167 lb 15.9 oz)  BSA (Calculated - sq m): 1.85 sq meters  BMI (Calculated): 28.8  Weight in (lb) to have BMI = 25: 145.3]    Physical Exam  Vitals reviewed.   Constitutional:       Appearance: Normal appearance. She is normal weight.   HENT:      Head: Normocephalic and atraumatic.      Right Ear: Tympanic membrane, ear canal and external ear normal.      Left Ear: Tympanic membrane, ear canal and external ear normal.      Nose: Nose normal.      Mouth/Throat:      Mouth: Mucous membranes are moist.      Pharynx: Oropharynx is clear.   Eyes:      Extraocular Movements: Extraocular movements intact.      Conjunctiva/sclera: Conjunctivae normal.      Pupils: Pupils are equal, round, and reactive to light.   Cardiovascular:      Rate and Rhythm: Normal rate and regular rhythm.      Pulses: Normal pulses.      Heart sounds: Normal heart sounds.   Pulmonary:      Effort: Pulmonary effort is normal.      Breath sounds: Normal breath sounds.   Abdominal:      General: Abdomen is flat. Bowel sounds are normal.      Palpations: Abdomen is soft.   Musculoskeletal:         General: Normal range of motion.      Cervical back: Normal range of motion and neck supple.   Skin:     General: Skin is warm and dry.      Capillary Refill: Capillary refill takes less than 2 seconds.   Neurological:      General: No focal deficit present.      Mental Status: She is alert and " oriented to person, place, and time. Mental status is at baseline.   Psychiatric:         Mood and Affect: Mood normal.         Behavior: Behavior normal.         Thought Content: Thought content normal.         Judgment: Judgment normal.            Assessment/Plan     Eva Hair is a 54 y.o.female with:    Back pain, unspecified back location, unspecified back pain laterality, unspecified chronicity  -     methocarbamoL (ROBAXIN) 500 MG Tab; Take 1 tablet (500 mg total) by mouth 4 (four) times daily. for 10 days  Dispense: 40 tablet; Refill: 0    Gastric reflux  -     Ambulatory referral/consult to Gastroenterology; Future; Expected date: 10/21/2024    Rash  -     triamcinolone acetonide 0.025% (KENALOG) 0.025 % cream; Apply topically 2 (two) times daily.  Dispense: 1 each; Refill: 0    Chronic pain of both knees  -     X-Ray Knee 1 or 2 View Bilateral; Future; Expected date: 10/14/2024    Other orders  -     albuterol (PROVENTIL/VENTOLIN HFA) 90 mcg/actuation inhaler; Inhale 2 puffs into the lungs once daily. Rescue  Dispense: 18 g; Refill: 3        As above, continue current medications and maintain follow up with specialists.  Return to clinic as needed.    Greater than 50% of visit was spent face to face with patient.  All questions were answered to patient's satisfaction.          Karen L Spencer, NP-C [facility]

## 2024-10-11 ENCOUNTER — CLINICAL SUPPORT (OUTPATIENT)
Dept: REHABILITATION | Facility: HOSPITAL | Age: 54
End: 2024-10-11
Payer: COMMERCIAL

## 2024-10-11 DIAGNOSIS — G89.11 ACUTE LOW BACK PAIN DUE TO TRAUMA: Primary | ICD-10-CM

## 2024-10-11 DIAGNOSIS — M25.69 DECREASED RANGE OF MOTION OF TRUNK AND BACK: ICD-10-CM

## 2024-10-11 DIAGNOSIS — M54.50 ACUTE LOW BACK PAIN DUE TO TRAUMA: Primary | ICD-10-CM

## 2024-10-11 DIAGNOSIS — M54.9 BACK PAIN, UNSPECIFIED BACK LOCATION, UNSPECIFIED BACK PAIN LATERALITY, UNSPECIFIED CHRONICITY: ICD-10-CM

## 2024-10-11 PROCEDURE — 97161 PT EVAL LOW COMPLEX 20 MIN: CPT

## 2024-10-11 PROCEDURE — 97110 THERAPEUTIC EXERCISES: CPT
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
## 2024-10-13 NOTE — PROGRESS NOTES
[facility]   Physical Therapy Treatment Note      Name: Eva Hair  Clinic Number: [id number]    Therapy Diagnosis:   Encounter Diagnoses   Name Primary?    Acute low back pain due to trauma Yes    Decreased range of motion of trunk and back      Physician: Renetta Salcedo NP    Visit Date: 10/14/2024  Physician Orders: PT Eval and Treat  Medical Diagnosis from Referral: M54.9 (ICD-10-CM) - Back pain, unspecified back location, unspecified back pain laterality, unspecified chronicity  Evaluation Date: 10/11/2024  Authorization Period Expiration: 9/30/2025  Plan of Care Expiration: 12/20/2024  Reassessment Due: 11/11/2024  Visit # / Visits authorized: 1/1 1/10    PTA Visit #: 0/5     Precautions: Standard     **Will Need to Schedule Next Appointment**    Time In: 11:01 am   Time Out: 11:58 am  Total Billable Time: 57 minutes    Subjective     Pt reports: That she went to the doctor today and got some X-rays done on her knees because they have been bothering her a little bit more.  The upper part of her right mid-back and the left side of the low back have been the more painful parts.  She is on a different pain medication now because the other one was giving her a headache and she hasn't had to work for a few days so she is feeling better.      She was somewhat compliant with home exercise program.  Response to previous treatment: No Change - Initial Eval   Functional change: No Change - Initial Eval     Pain: 5/10  Location: Low Back      Objective      Objective Measures updated at progress report unless specified.     Treatment     Eva received the treatments listed below:      therapeutic exercises to develop strength, endurance, ROM, and core stabilization for 0 minutes including:      manual therapy techniques: The techniques below were applied for 10 minutes:     Lumbar Gapping Grade III-IV (L1-L2, L3-L4)   Breaking Bread Mobilization to Soft Tissue and Lumbar Spine      neuromuscular re-education activities to improve: Balance, Coordination, Kinesthetic, Sense, Proprioception, and Posture for 47 minutes. The following activities were included:    TA Activation with Biofeedback 10 x 10 Sec   TA Activation with Biofeedback and Hip Fall Out 1 x 12   TA Activation with 4 Way Palloff Press RTB 4 Laps x 10 Sec Holds   Bridges RTB 3 x 8 3 Sec Holds (Cue for Glut Activation and No Lumbar Extension)   Side Lying Clamshells RTB 2 x 12 3 Sec Holds   Quadruped Cat/Cow 1 x 15 5 Sec Holds (Cue to Not Past Neutral)     therapeutic activities to improve functional performance for 0 minutes, including:      hot pack for 0 minutes to .    cold pack for 0 minutes to .    Patient Education and Home Exercises       Education provided:   - HEP   - Plan of Care   - Explanation of Findings     Written Home Exercises Provided: Patient instructed to cont prior HEP. Exercises were reviewed and Eva was able to demonstrate them prior to the end of the session.  Eva demonstrated good  understanding of the education provided. See EMR under Patient Instructions for exercises provided during therapy sessions    Assessment     Eva presented to physical therapy with reports of no significant changes from initial evaluation except for potentially a slight decreased pain level potentially from new medication and time off of work.  She continued to demonstrate difficulty with reversal of the hyperlordotic curvature of the lumbar spine.  She had points of noted hypomobility found in the lumbar spine with a guarding and protective contraction of the overlying musculature.  Manual interventions were provided to address the joint dysfunction and work to decrease tension in the paraspinals.  TA activation with pelvic tilt was taught today and utilized biofeedback for better activation and control of the core musculature and holding into a more neutral spine positioning.  She worked to improve glut and hip  activation for offloading and support of the lumbar spine and showed some difficulty with coordination of the glut activation without excessive lumbar extension and showed easy fatigue for the hip musculature.  Overall she tolerated the session well and will continue to benefit from skilled interventions to address core stability, postural changes, as well as hip and glut strengthening.  She will continue to be progressed as appropriate.      Eva Is progressing well towards her goals.   Pt prognosis is Good.     Pt will continue to benefit from skilled outpatient physical therapy to address the deficits listed in the problem list box on initial evaluation, provide pt/family education and to maximize pt's level of independence in the home and community environment.     Pt's spiritual, cultural and educational needs considered and pt agreeable to plan of care and goals.     Anticipated barriers to physical therapy: Work Schedule     GOALS: Pt is in agreement with the following goals.     Short Term Goals: (4 weeks)  1. Patient will demonstrate good transverse abdominal muscle contraction for improved deep abdominal strength and lumbar stability.  2. Increase lumbar ROM to 100% of WNL in order to improve functional mobility.     3. Patient will demonstrate good sitting/standing posture and body mechanics for improved spine health and decreased risk of future injury.   4. Patient will improve bilateral lower extremity MMT grades by >/=1/2 grade in order to improve strength for standing ADLs.   5. Patient will score >/= 10 repetitions on 30-Second Sit to  order to improve bilateral lower extremity muscular power for transfers.   6. Patient will be compliant with home exercise program to supplement therapy in promoting functional mobility.     Long Term Goals (8 Weeks):   1. Patient will improve FOTO score to </= 66% functional ability, demonstrating improved perceived limitation with maintaining/changing  body position.   2. Patient will be 100% compliant with updated HEP in order to maximize PT benefits post-discharge.  3. Patient will improve bilateral lower extremity MMT grades by >/=1 grade in order to improve strength for standing activities of daily living.  4. Patient will score >/= 14 repetitions on 30-Second Sit to  order to improve bilateral lower extremity muscular power for transfers.    5. Patient will report pain at worst over 2-week period as </=2/10 in order to improve general activity tolerance.   6. Pt will begin some form of home/community fitness in order to sustain progress gained in PT        Plan      Outpatient physical therapy 2x week for 8 weeks  to include the following:   - Patient education  - Therapeutic exercise  - Manual therapy  - Neuromuscular Re-education  - Therapeutic activity   - Modalities     Pt may be seen by PTA as part of the rehabilitation team.        Junior Guadarrama, PT

## 2024-10-14 ENCOUNTER — CLINICAL SUPPORT (OUTPATIENT)
Dept: REHABILITATION | Facility: HOSPITAL | Age: 54
End: 2024-10-14
Payer: COMMERCIAL

## 2024-10-14 ENCOUNTER — OFFICE VISIT (OUTPATIENT)
Dept: PRIMARY CARE CLINIC | Facility: CLINIC | Age: 54
End: 2024-10-14
Payer: COMMERCIAL

## 2024-10-14 ENCOUNTER — HOSPITAL ENCOUNTER (OUTPATIENT)
Dept: RADIOLOGY | Facility: HOSPITAL | Age: 54
Discharge: HOME | End: 2024-10-14
Attending: NURSE PRACTITIONER
Payer: COMMERCIAL

## 2024-10-14 VITALS
HEIGHT: 64 IN | OXYGEN SATURATION: 98 % | RESPIRATION RATE: 20 BRPM | WEIGHT: 168 LBS | BODY MASS INDEX: 28.68 KG/M2 | DIASTOLIC BLOOD PRESSURE: 74 MMHG | HEART RATE: 80 BPM | SYSTOLIC BLOOD PRESSURE: 132 MMHG

## 2024-10-14 DIAGNOSIS — M54.9 BACK PAIN, UNSPECIFIED BACK LOCATION, UNSPECIFIED BACK PAIN LATERALITY, UNSPECIFIED CHRONICITY: Primary | ICD-10-CM

## 2024-10-14 DIAGNOSIS — M25.69 DECREASED RANGE OF MOTION OF TRUNK AND BACK: ICD-10-CM

## 2024-10-14 DIAGNOSIS — M25.562 CHRONIC PAIN OF BOTH KNEES: ICD-10-CM

## 2024-10-14 DIAGNOSIS — K21.9 GASTRIC REFLUX: ICD-10-CM

## 2024-10-14 DIAGNOSIS — R21 RASH: ICD-10-CM

## 2024-10-14 DIAGNOSIS — G89.29 CHRONIC PAIN OF BOTH KNEES: ICD-10-CM

## 2024-10-14 DIAGNOSIS — M25.561 CHRONIC PAIN OF BOTH KNEES: ICD-10-CM

## 2024-10-14 DIAGNOSIS — M54.50 ACUTE LOW BACK PAIN DUE TO TRAUMA: Primary | ICD-10-CM

## 2024-10-14 DIAGNOSIS — G89.11 ACUTE LOW BACK PAIN DUE TO TRAUMA: Primary | ICD-10-CM

## 2024-10-14 PROCEDURE — 3078F DIAST BP <80 MM HG: CPT | Mod: CPTII,S$GLB,, | Performed by: NURSE PRACTITIONER

## 2024-10-14 PROCEDURE — 99999 PR PBB SHADOW E&M-EST. PATIENT-LVL V: CPT | Mod: PBBFAC,,, | Performed by: NURSE PRACTITIONER

## 2024-10-14 PROCEDURE — 1160F RVW MEDS BY RX/DR IN RCRD: CPT | Mod: CPTII,S$GLB,, | Performed by: NURSE PRACTITIONER

## 2024-10-14 PROCEDURE — 73560 X-RAY EXAM OF KNEE 1 OR 2: CPT | Mod: TC,50,PN

## 2024-10-14 PROCEDURE — 97140 MANUAL THERAPY 1/> REGIONS: CPT

## 2024-10-14 PROCEDURE — 97112 NEUROMUSCULAR REEDUCATION: CPT

## 2024-10-14 PROCEDURE — 99396 PREV VISIT EST AGE 40-64: CPT | Mod: S$GLB,,, | Performed by: NURSE PRACTITIONER

## 2024-10-14 PROCEDURE — 73560 X-RAY EXAM OF KNEE 1 OR 2: CPT | Mod: 26,,, | Performed by: RADIOLOGY

## 2024-10-14 PROCEDURE — 3075F SYST BP GE 130 - 139MM HG: CPT | Mod: CPTII,S$GLB,, | Performed by: NURSE PRACTITIONER

## 2024-10-14 PROCEDURE — 3008F BODY MASS INDEX DOCD: CPT | Mod: CPTII,S$GLB,, | Performed by: NURSE PRACTITIONER

## 2024-10-14 PROCEDURE — 3044F HG A1C LEVEL LT 7.0%: CPT | Mod: CPTII,S$GLB,, | Performed by: NURSE PRACTITIONER

## 2024-10-14 PROCEDURE — 1159F MED LIST DOCD IN RCRD: CPT | Mod: CPTII,S$GLB,, | Performed by: NURSE PRACTITIONER

## 2024-10-14 RX ORDER — METHOCARBAMOL 500 MG/1
500 TABLET, FILM COATED ORAL 4 TIMES DAILY
Qty: 40 TABLET | Refills: 0 | Status: SHIPPED | OUTPATIENT
Start: 2024-10-14 | End: 2024-10-24

## 2024-10-14 RX ORDER — TRIAMCINOLONE ACETONIDE 0.25 MG/G
CREAM TOPICAL 2 TIMES DAILY
Qty: 1 EACH | Refills: 0 | Status: SHIPPED | OUTPATIENT
Start: 2024-10-14

## 2024-10-14 RX ORDER — ALBUTEROL SULFATE 90 UG/1
2 INHALANT RESPIRATORY (INHALATION) DAILY
Qty: 18 G | Refills: 3 | Status: SHIPPED | OUTPATIENT
Start: 2024-10-14

## 2024-10-21 ENCOUNTER — TELEPHONE (OUTPATIENT)
Dept: ENDOSCOPY | Facility: HOSPITAL | Age: 54
End: 2024-10-21
Payer: COMMERCIAL

## 2024-10-21 ENCOUNTER — OFFICE VISIT (OUTPATIENT)
Dept: GASTROENTEROLOGY | Facility: CLINIC | Age: 54
End: 2024-10-21
Payer: COMMERCIAL

## 2024-10-21 VITALS
DIASTOLIC BLOOD PRESSURE: 79 MMHG | BODY MASS INDEX: 28.49 KG/M2 | HEART RATE: 84 BPM | WEIGHT: 166.88 LBS | SYSTOLIC BLOOD PRESSURE: 117 MMHG | HEIGHT: 64 IN

## 2024-10-21 DIAGNOSIS — R13.10 DYSPHAGIA, UNSPECIFIED TYPE: ICD-10-CM

## 2024-10-21 DIAGNOSIS — R11.0 NAUSEA: ICD-10-CM

## 2024-10-21 DIAGNOSIS — R13.10 DYSPHAGIA, UNSPECIFIED TYPE: Primary | ICD-10-CM

## 2024-10-21 DIAGNOSIS — K21.9 GASTROESOPHAGEAL REFLUX DISEASE, UNSPECIFIED WHETHER ESOPHAGITIS PRESENT: Primary | ICD-10-CM

## 2024-10-21 DIAGNOSIS — K21.9 GASTROESOPHAGEAL REFLUX DISEASE, UNSPECIFIED WHETHER ESOPHAGITIS PRESENT: ICD-10-CM

## 2024-10-21 PROCEDURE — 3008F BODY MASS INDEX DOCD: CPT | Mod: CPTII,S$GLB,,

## 2024-10-21 PROCEDURE — 99214 OFFICE O/P EST MOD 30 MIN: CPT | Mod: S$GLB,,,

## 2024-10-21 PROCEDURE — 3078F DIAST BP <80 MM HG: CPT | Mod: CPTII,S$GLB,,

## 2024-10-21 PROCEDURE — 99999 PR PBB SHADOW E&M-EST. PATIENT-LVL IV: CPT | Mod: PBBFAC,,,

## 2024-10-21 PROCEDURE — 3044F HG A1C LEVEL LT 7.0%: CPT | Mod: CPTII,S$GLB,,

## 2024-10-21 PROCEDURE — 3074F SYST BP LT 130 MM HG: CPT | Mod: CPTII,S$GLB,,

## 2024-10-21 PROCEDURE — 1159F MED LIST DOCD IN RCRD: CPT | Mod: CPTII,S$GLB,,

## 2024-10-21 NOTE — PROGRESS NOTES
Gastroenterology Clinic Consultation Note    Reason for Visit:  The primary encounter diagnosis was Dysphagia, unspecified type. Diagnoses of Gastroesophageal reflux disease, unspecified whether esophagitis present and Nausea were also pertinent to this visit.    PCP:   Renetta Salcedo   [address] [facility] [id number]    Initial HPI   This is a 54 y.o. female presenting for GERD with dysphagia. Patient states symptoms began about six months ago and have progressively worsened since then. She notes increased episodes of reflux with water brash mostly occurring during the night time hours waking her up from sleep. Also experiencing frequent episodes of nausea. She denies any obvious food triggers that worsen her symptoms. She is more concerned with trouble swallowing. Notices foods have been slow to go down. Does feel like liquids might be causing her issues as well. No obvious foods that cause her more problems. She has also developed a cough that she suspects may be related to her symptoms. Also noticed increased drooling episodes during the night and is not sure if this is related. She was started on pantoprazole 40 mg daily by her PCP about three weeks ago. She has not noticed a major difference in her symptoms since starting this medication. Denies hematemesis, changes in bowel patterns, or blood in her stool.  No other GI issues or concerns discussed at today's visit.     Denies any odynophagia, vomiting. No abdominal pains, changes in bowel pattern, blood/ mucus in stool or unintentional weight loss. No melena or maroon stools. No recent changes in diet or medications. No family history of IBD, Celiac disease or GI malignancy. No regular NSAIDs. No alcohol or tobacco use. No recent antibiotic use, travels or sick contacts. No prior history of C.diff.    Interval History  PCP visit on 9/30/2024  Gastric reflux  -     pantoprazole (PROTONIX) 40 MG tablet; Take 1 tablet (40 mg total) by mouth  once daily.  Dispense: 90 tablet; Refill: 3     Abdominal Surgeries: c/s, hysterectomy    ROS:  Review of Systems   Constitutional:  Negative for chills, fever, malaise/fatigue and weight loss.   Respiratory:  Negative for cough, hemoptysis, sputum production, shortness of breath and wheezing.    Cardiovascular:  Negative for chest pain, palpitations, orthopnea, claudication, leg swelling and PND.   Gastrointestinal:  Positive for heartburn and nausea. Negative for abdominal pain, blood in stool, constipation, diarrhea, melena and vomiting.   Genitourinary:  Negative for dysuria, flank pain, frequency, hematuria and urgency.   Musculoskeletal:  Negative for back pain, falls, joint pain, myalgias and neck pain.   Skin:  Negative for itching and rash.   Neurological:  Negative for dizziness, seizures, loss of consciousness, weakness and headaches.   Psychiatric/Behavioral:  The patient is not nervous/anxious and does not have insomnia.       Medical History:  has a past medical history of Eczema (2018), Environmental and seasonal allergies (2018), and Mild persistent asthma without complication (2018).    Surgical History:  has a past surgical history that includes Hysterectomy;  section; Hemorrhoid surgery; Colonoscopy (N/A, 2023); and Sinus surgery.    Family History: family history includes Arthritis in her mother; Asthma in her brother; Breast cancer in her maternal aunt; Breast cancer (age of onset: 56) in her sister; Cerebral aneurysm (age of onset: 72) in her father; Diabetes in her maternal grandfather and maternal grandmother; Gout in her brother and mother; Hypertension in her brother and father; No Known Problems in her brother, brother, brother, daughter, paternal grandfather, paternal grandmother, sister, and son; Ovarian cancer in her maternal aunt..     Review of patient's allergies indicates:   Allergen Reactions    Cat/feline products     Flexeril [cyclobenzaprine]       "headache    Grass pollen-red top, standard     Iodine and iodide containing products Swelling    Milk containing products (dairy)      Whole Milk - Headache    Oxycodone Other (See Comments)     Causes hallucinations    Pecan nut      Headache    Pork/porcine containing products Other (See Comments)     Headache     Current Outpatient Medications on File Prior to Visit   Medication Sig Dispense Refill    albuterol (PROVENTIL/VENTOLIN HFA) 90 mcg/actuation inhaler Inhale 2 puffs into the lungs once daily. Rescue 18 g 3    azelastine (ASTELIN) 137 mcg (0.1 %) nasal spray 1 spray by Nasal route daily as needed.  4    eszopiclone (LUNESTA) 2 MG Tab       fluocinolone acetonide oiL (DERMOTIC OIL) 0.01 % Drop Place 3 drops in ear(s) 2 (two) times daily. 20 mL 2    fluticasone propionate (FLONASE) 50 mcg/actuation nasal spray APPLY 1 SPRAY INTO EACH NOSTRIL TWICE A DAY      ibuprofen (ADVIL,MOTRIN) 800 MG tablet Take 1 tablet (800 mg total) by mouth 3 (three) times daily. 90 tablet 0    loratadine (CLARITIN) 10 mg tablet Take 10 mg by mouth once daily.      methocarbamoL (ROBAXIN) 500 MG Tab Take 1 tablet (500 mg total) by mouth 4 (four) times daily. for 10 days 40 tablet 0    olopatadine (PATANOL) 0.1 % ophthalmic solution INSTILL 2 DROPS INTO BOTH EARS TWICE A DAY  4    pantoprazole (PROTONIX) 40 MG tablet Take 1 tablet (40 mg total) by mouth once daily. 90 tablet 3    triamcinolone acetonide 0.025% (KENALOG) 0.025 % cream Apply topically 2 (two) times daily. 1 each 0    budesonide-formoterol 160-4.5 mcg (SYMBICORT) 160-4.5 mcg/actuation HFAA Inhale 2 puffs into the lungs once daily. Controller 10.2 g 5     No current facility-administered medications on file prior to visit.     Objective Findings:    Vital Signs:  /79 (BP Location: Left arm, Patient Position: Sitting)   Pulse 84   Ht 5' 4" (1.626 m)   Wt 75.7 kg (166 lb 14.2 oz)   BMI 28.65 kg/m²   Body mass index is 28.65 kg/m².    Physical " Exam  Constitutional:       Appearance: Normal appearance. She is normal weight.   HENT:      Head: Normocephalic and atraumatic.      Nose: Nose normal.      Mouth/Throat:      Mouth: Mucous membranes are moist.      Pharynx: Oropharynx is clear.   Cardiovascular:      Rate and Rhythm: Normal rate and regular rhythm.      Pulses: Normal pulses.      Heart sounds: Normal heart sounds.   Pulmonary:      Effort: Pulmonary effort is normal.      Breath sounds: Normal breath sounds.   Abdominal:      General: Bowel sounds are normal.      Palpations: Abdomen is soft.   Musculoskeletal:         General: Normal range of motion.      Cervical back: Normal range of motion and neck supple.   Skin:     General: Skin is warm and dry.   Neurological:      General: No focal deficit present.      Mental Status: She is alert and oriented to person, place, and time.   Psychiatric:         Mood and Affect: Mood normal.         Behavior: Behavior normal.         Thought Content: Thought content normal.         Judgment: Judgment normal.     Labs:  Lab Results   Component Value Date    WBC 6.26 07/26/2024    HGB 10.9 (L) 07/26/2024    HCT 33.7 (L) 07/26/2024     07/26/2024    CHOL 183 09/30/2024    TRIG 51 09/30/2024    HDL 58 09/30/2024    ALKPHOS 63 09/30/2024    ALT 14 09/30/2024    AST 18 09/30/2024     09/30/2024    K 3.8 09/30/2024     09/30/2024    CREATININE 0.8 09/30/2024    BUN 15 09/30/2024    CO2 25 09/30/2024    TSH 0.375 (L) 07/26/2024    INR 1.0 09/15/2010    HGBA1C 5.1 09/30/2024     Imaging reviewed:   No recent/relevant GI imaging performed for review.     Endoscopy reviewed:   Procedure:             Colonoscopy  3/6/2023  Indications:           Screening for malignant neoplasm in the colon   Impression:            - Anal papilla(e) were hypertrophied.                          - The examination was otherwise normal on direct                          and retroflexion views.                           - No specimens collected.   Repeat colonoscopy in 10 years for screening                          purposes.     Assessment  1. Dysphagia, unspecified type    2. Gastroesophageal reflux disease, unspecified whether esophagitis present    3. Nausea       GERD with dysphagia- will proceed with EGD for further investigation of underlying causes. Will continue PPI therapy at this time with consideration of increasing dose to BID pending EGD results.       Plan:  Continue pantoprazole 40 mg daily for now; dietary and lifestyle recommendations included in After Visit Summary   Schedule EGD with biopsies  3. F/u in 3 months   4. Repeat colonoscopy 2033      Thank you for allowing me to participate in this patient's care.    Sincerely,     SAMUEL AU  Gastroenterology Department  [facility]

## 2024-10-21 NOTE — TELEPHONE ENCOUNTER
"   Message  Received: Today  Cassidy Lindo NP Piglia, Ashley, RN  Caller: Unspecified (Today,  2:22 PM)  Procedure: EGD    Diagnosis: GERD and Dysphagia    Procedure Timin-12 weeks    *If within 4 weeks selected, please theresa as high priority*    *If greater than 12 weeks, please select "5-12 weeks" and delay sending until 3 months prior to requested date*    Location: [place]    Additional Scheduling Information: No scheduling concerns    Prep Specifications:Standard prep    Is the patient taking a GLP-1 Agonist:no    Have you attached a patient to this message: yes  "

## 2024-10-21 NOTE — PATIENT INSTRUCTIONS
For GERD/Reflux:     Take your PPI 30-45 minutes before your first protein containing meal (breakfast) every day. Take twice daily for 8 weeks. If symptoms improve ok discontinue an use PPI as needed for symptoms.      Take Pepcid 20mg every evening before bedtime to help with nocturnal symptoms, as needed.     Remain upright for at least 3 hours after eating.      Elevate the head of the bed for nighttime.      Avoid foods that you have noticed make your symptoms worse (possible triggers include: peppermint, alcohol, chocolate, caffeine, spicy foods, greasy/fried foods, acidic foods-citrus).     GERD Treatment: Lifestyle and Dietary Changes    Dietary and lifestyle changes are the first step in treating GERD. Certain foods make the reflux worse. Suggestions to help alleviate symptoms include:  Lose weight if you are overweight -- of all of the lifestyle changes you can make, this one is the most effective.  Avoid foods that increase the level of acid in your stomach, including caffeinated beverages.  Avoid foods that decrease the pressure in the lower esophagus, such as fatty foods, alcohol and peppermint.  Avoid foods that affect peristalsis (the muscle movements in your digestive tract), such as coffee, alcohol and acidic liquids.  Avoid foods that slow gastric emptying, including fatty foods.  Avoid large meals.  Quit smoking.  Do not lie down immediately after a meal.  Elevate the level of your head when you lie down.    Foods That May Cause Heartburn  Foods commonly known to be heartburn triggers cause the esophageal sphincter to relax and delay the digestive process, letting food sit in the stomach longer, says Betty. The worst culprits? Foods that are high in fat, salt or spice such as:  Fried food  Fast food  Pizza  Potato chips and other processed snacks  Chili powder and pepper (white, black, cayenne)  Fatty meats such as patton and sausage  Cheese  Other foods that can cause the same problem  include:  Tomato-based sauces  Citrus fruits  Chocolate  Peppermint  Carbonated beverages    Moderation is key since many people may not be able to or want to completely eliminate these foods. But try to avoid eating problem foods late in the evening closer to bedtime, so they're not sitting in your stomach and then coming up your esophagus when you lay down at night. It's also a good idea to eat small frequent meals instead of bigger, heavier meals and avoid late-night dinners and bedtime snacks.    Foods That Help Prevent Acid Reflux  Good news: There are plenty of things you can eat to help prevent acid reflux. Stock your kitchen with foods from these three categories:    High-fiber foods  Fibrous foods make you feel full so you're less likely to overeat, which may contribute to heartburn. So, load up on healthy fiber from these foods:  Whole grains such as oatmeal, couscous and brown rice.  Root vegetables such as sweet potatoes, carrots and beets.  Green vegetables such as asparagus, broccoli and green beans.    Alkaline foods  Foods fall somewhere along the pH scale (an indicator of acid levels). Those that have a low pH are acidic and more likely to cause reflux. Those with higher pH are alkaline and can help offset strong stomach acid. Alkaline foods include:  Bananas  Melons  Cauliflower  Fennel  Nuts    Watery foods  Eating foods that contain a lot of water can dilute and weaken stomach acid. Choose foods such as:  Celery  Cucumber  Lettuce  Watermelon  Broth-based soups  Herbal tea

## 2024-10-21 NOTE — TELEPHONE ENCOUNTER
Referral for procedure from [facility]      Spoke to patient to schedule procedure(s) Upper Endoscopy (EGD)       Physician to perform procedure(s) Dr. GERARDO Flores  Date of Procedure (s) 11/11/2024  Arrival Time 9:00 AM  Time of Procedure(s) 10:00 AM   Location of Procedure(s) [facility] 2nd Floor  Type of Rx Prep sent to patient: N/A  Instructions provided to patient via [facility]    Patient was informed on the following information and verbalized understanding. Screening questionnaire reviewed with patient and complete. If procedure requires anesthesia, a responsible adult needs to be present to accompany the patient home, patient cannot drive after receiving anesthesia. Appointment details are tentative, especially check-in time. Patient will receive a prep-op call 7 days prior to confirm check-in time for procedure. If applicable the patient should contact their pharmacy to verify Rx for procedure prep is ready for pick-up. Patient was advised to call the scheduling department at [phone] if pharmacy states no Rx is available. Patient was advised to call the endoscopy scheduling department if any questions or concerns arise.       Endoscopy Scheduling Department

## 2024-10-25 ENCOUNTER — CLINICAL SUPPORT (OUTPATIENT)
Dept: REHABILITATION | Facility: HOSPITAL | Age: 54
End: 2024-10-25
Payer: COMMERCIAL

## 2024-10-25 DIAGNOSIS — M25.69 DECREASED RANGE OF MOTION OF TRUNK AND BACK: ICD-10-CM

## 2024-10-25 DIAGNOSIS — G89.11 ACUTE LOW BACK PAIN DUE TO TRAUMA: Primary | ICD-10-CM

## 2024-10-25 DIAGNOSIS — M54.50 ACUTE LOW BACK PAIN DUE TO TRAUMA: Primary | ICD-10-CM

## 2024-10-25 PROCEDURE — 97112 NEUROMUSCULAR REEDUCATION: CPT | Mod: CQ

## 2024-10-25 PROCEDURE — 97110 THERAPEUTIC EXERCISES: CPT | Mod: CQ

## 2024-11-01 DIAGNOSIS — M54.9 BACK PAIN, UNSPECIFIED BACK LOCATION, UNSPECIFIED BACK PAIN LATERALITY, UNSPECIFIED CHRONICITY: ICD-10-CM

## 2024-11-01 RX ORDER — IBUPROFEN 800 MG/1
800 TABLET ORAL 3 TIMES DAILY
Qty: 90 TABLET | Refills: 0 | Status: SHIPPED | OUTPATIENT
Start: 2024-11-01

## 2024-11-04 ENCOUNTER — TELEPHONE (OUTPATIENT)
Dept: ENDOSCOPY | Facility: HOSPITAL | Age: 54
End: 2024-11-04
Payer: COMMERCIAL

## 2024-11-04 NOTE — TELEPHONE ENCOUNTER
Spoke to patient for pre-call to confirm scheduled Upper Endoscopy (EGD) and patient verbalized understanding of the following:       Date & arrival time of procedure(s) verified 11/11/24, 9:00 AM.  Location of procedure(s) [facility] 2nd Floor verified.  NPO status reinforced. Ok to continue clear liquids until 8:00 AM.   Patient denies use of blood thinners, GLP-1 medications, and weight loss medications.  Patient confirmed receipt of prep instructions.  Instructions provided to patient via [facility].  Patient confirmed ride home after procedure if procedure requires anesthesia.   Pre-call screening questionnaire reviewed and completed with patient.   Appointment details are tentative, including check-in time.  If the patient begins taking any blood thinning medications, injectable weight loss/diabetes medications (other than insulin), or Adipex (phentermine) patient was instructed to contact the endoscopy scheduling department as soon as possible.  Patient was advised to call the endoscopy scheduling department if any questions or concerns arise.      Endoscopy Scheduling Department

## 2024-11-08 ENCOUNTER — TELEPHONE (OUTPATIENT)
Dept: ENDOSCOPY | Facility: HOSPITAL | Age: 54
End: 2024-11-08
Payer: COMMERCIAL

## 2024-11-09 ENCOUNTER — ANESTHESIA EVENT (OUTPATIENT)
Dept: ENDOSCOPY | Facility: HOSPITAL | Age: 54
End: 2024-11-09
Payer: COMMERCIAL

## 2024-11-10 NOTE — ANESTHESIA PREPROCEDURE EVALUATION
11/09/2024  Eva Hair is a 54 y.o., female.  [facility]  Anesthesia Pre-Operative Evaluation       Patient Name: Eva Hair  YOB: 1970  MRN: [id number]  CSN: [id number]      Code Status: No Order   Date of Procedure: 11/11/2024  Anesthesia: Choice Procedure: Procedure(s) (LRB):  EGD (ESOPHAGOGASTRODUODENOSCOPY) (N/A)  Pre-Operative Diagnosis: Gastroesophageal reflux disease, unspecified whether esophagitis present [K21.9]  Dysphagia, unspecified type [R13.10]  Proceduralist: Surgeons and Role:     * Emory Flores MD - Primary        SUBJECTIVE:   Eva Hair is a 54 y.o. female who  has a past medical history of Eczema (8/20/2018), Environmental and seasonal allergies (8/20/2018), and Mild persistent asthma without complication (8/20/2018)..     she has a current medication list which includes the following long-term medication(s): albuterol, azelastine, budesonide-formoterol 160-4.5 mcg, olopatadine, pantoprazole, and triamcinolone acetonide 0.025%.     ALLERGIES:     Review of patient's allergies indicates:   Allergen Reactions    Cat/feline products     Flexeril [cyclobenzaprine]      headache    Grass pollen-red top, standard     Iodine and iodide containing products Swelling    Milk containing products (dairy)      Whole Milk - Headache    Oxycodone Other (See Comments)     Causes hallucinations    Pecan nut      Headache    Pork/porcine containing products Other (See Comments)     Headache     LDA:          Lines/Drains/Airways       None                  Anesthesia Evaluation      Airway   Dental      Pulmonary    (+) asthma  Cardiovascular     Neuro/Psych      GI/Hepatic/Renal      Endo/Other    Abdominal                     MEDICATIONS:     Antibiotics (From admission, onward)      None          VTE Risk Mitigation (From admission, onward)      None               No current facility-administered medications for this encounter.     Current Outpatient Medications   Medication Sig Dispense Refill    albuterol (PROVENTIL/VENTOLIN HFA) 90 mcg/actuation inhaler Inhale 2 puffs into the lungs once daily. Rescue 18 g 3    azelastine (ASTELIN) 137 mcg (0.1 %) nasal spray 1 spray by Nasal route daily as needed.  4    budesonide-formoterol 160-4.5 mcg (SYMBICORT) 160-4.5 mcg/actuation HFAA Inhale 2 puffs into the lungs once daily. Controller 10.2 g 5    eszopiclone (LUNESTA) 2 MG Tab       fluocinolone acetonide oiL (DERMOTIC OIL) 0.01 % Drop Place 3 drops in ear(s) 2 (two) times daily. 20 mL 2    fluticasone propionate (FLONASE) 50 mcg/actuation nasal spray APPLY 1 SPRAY INTO EACH NOSTRIL TWICE A DAY      ibuprofen (ADVIL,MOTRIN) 800 MG tablet TAKE 1 TABLET BY MOUTH THREE TIMES A DAY 90 tablet 0    loratadine (CLARITIN) 10 mg tablet Take 10 mg by mouth once daily.      olopatadine (PATANOL) 0.1 % ophthalmic solution INSTILL 2 DROPS INTO BOTH EARS TWICE A DAY  4    pantoprazole (PROTONIX) 40 MG tablet Take 1 tablet (40 mg total) by mouth once daily. 90 tablet 3    triamcinolone acetonide 0.025% (KENALOG) 0.025 % cream Apply topically 2 (two) times daily. 1 each 0          History:   There are no hospital problems to display for this patient.    Surgical History:    has a past surgical history that includes Hysterectomy;  section; Hemorrhoid surgery; Colonoscopy (N/A, 2023); and Sinus surgery.   Social History:    reports being sexually active and has had partner(s) who are male.  reports that she has never smoked. She has never used smokeless tobacco. She reports current alcohol use. She reports that she does not use drugs.     OBJECTIVE:     Vital Signs (Most Recent):    Vital Signs Range (Last 24H):          There is no height or weight on file to calculate BMI.   Wt Readings from Last 4 Encounters:   10/21/24 75.7 kg (166 lb 14.2 oz)   10/14/24 76.2 kg (167  "lb 15.9 oz)   09/30/24 76.8 kg (169 lb 5 oz)   07/26/24 76.5 kg (168 lb 10.4 oz)       Significant Labs:  Lab Results   Component Value Date    WBC 6.26 07/26/2024    HGB 10.9 (L) 07/26/2024    HCT 33.7 (L) 07/26/2024     07/26/2024     09/30/2024    K 3.8 09/30/2024     09/30/2024    CREATININE 0.8 09/30/2024    BUN 15 09/30/2024    CO2 25 09/30/2024    GLU 83 09/30/2024    CALCIUM 9.3 09/30/2024    ALKPHOS 63 09/30/2024    ALT 14 09/30/2024    AST 18 09/30/2024    ALBUMIN 3.9 09/30/2024    INR 1.0 09/15/2010    APTT 25.8 09/15/2010    HGBA1C 5.1 09/30/2024     No LMP recorded. Patient has had a hysterectomy.  No results found for this or any previous visit (from the past 72 hours).    EKG:       TTE:  No results found for this or any previous visit.  No results found for: "EF"   No results found for this or any previous visit.  MABEL:  No results found for this or any previous visit.  Stress Test:  No results found for this or any previous visit.     LHC:  No results found for this or any previous visit.     PFT:  No results found for: "FEV1", "FVC", "NAY7NVM", "TLC", "DLCO"     ASSESSMENT/PLAN:        Pre-op Assessment    I have reviewed the Patient Summary Reports.     I have reviewed the Nursing Notes. I have reviewed the NPO Status.   I have reviewed the Medications.     Review of Systems  Anesthesia Hx:  No problems with previous Anesthesia             Denies Family Hx of Anesthesia complications.    Denies Personal Hx of Anesthesia complications.                    Hematology/Oncology:  Hematology Normal   Oncology Normal                                   EENT/Dental:  chronic allergic rhinitis           Cardiovascular:  Cardiovascular Normal                                              Pulmonary:    Asthma                    Renal/:  Renal/ Normal                 Hepatic/GI:  Hepatic/GI Normal                    Musculoskeletal:  Musculoskeletal Normal              "   Neurological:  Neurology Normal                                      Endocrine:  Endocrine Normal            Dermatological:  Skin Normal    Psych:    depression                   Anesthesia Plan  Type of Anesthesia, risks & benefits discussed:    Anesthesia Type: Gen Natural Airway  Intra-op Monitoring Plan: Standard ASA Monitors  Post Op Pain Control Plan: multimodal analgesia  Induction:  IV  Informed Consent: Informed consent signed with the Patient and all parties understand the risks and agree with anesthesia plan.  All questions answered. Patient consented to blood products? No  ASA Score: 2  Day of Surgery Review of History & Physical: H&P Update referred to the surgeon/provider.    Ready For Surgery From Anesthesia Perspective.     .

## 2024-11-11 ENCOUNTER — HOSPITAL ENCOUNTER (OUTPATIENT)
Facility: HOSPITAL | Age: 54
Discharge: HOME | End: 2024-11-11
Attending: STUDENT IN AN ORGANIZED HEALTH CARE EDUCATION/TRAINING PROGRAM | Admitting: STUDENT IN AN ORGANIZED HEALTH CARE EDUCATION/TRAINING PROGRAM
Payer: COMMERCIAL

## 2024-11-11 ENCOUNTER — ANESTHESIA (OUTPATIENT)
Dept: ENDOSCOPY | Facility: HOSPITAL | Age: 54
End: 2024-11-11
Payer: COMMERCIAL

## 2024-11-11 VITALS
WEIGHT: 170 LBS | HEIGHT: 64 IN | SYSTOLIC BLOOD PRESSURE: 107 MMHG | HEART RATE: 83 BPM | OXYGEN SATURATION: 100 % | BODY MASS INDEX: 29.02 KG/M2 | RESPIRATION RATE: 19 BRPM | TEMPERATURE: 99 F | DIASTOLIC BLOOD PRESSURE: 62 MMHG

## 2024-11-11 DIAGNOSIS — R13.10 DYSPHAGIA: ICD-10-CM

## 2024-11-11 DIAGNOSIS — R13.10 DYSPHAGIA, UNSPECIFIED TYPE: Primary | ICD-10-CM

## 2024-11-11 PROCEDURE — 94761 N-INVAS EAR/PLS OXIMETRY MLT: CPT

## 2024-11-11 PROCEDURE — 37000009 HC ANESTHESIA EA ADD 15 MINS: Performed by: STUDENT IN AN ORGANIZED HEALTH CARE EDUCATION/TRAINING PROGRAM

## 2024-11-11 PROCEDURE — 88305 TISSUE EXAM BY PATHOLOGIST: CPT | Performed by: STUDENT IN AN ORGANIZED HEALTH CARE EDUCATION/TRAINING PROGRAM

## 2024-11-11 PROCEDURE — 43239 EGD BIOPSY SINGLE/MULTIPLE: CPT | Mod: ,,, | Performed by: STUDENT IN AN ORGANIZED HEALTH CARE EDUCATION/TRAINING PROGRAM

## 2024-11-11 PROCEDURE — 25000003 PHARM REV CODE 250: Performed by: NURSE ANESTHETIST, CERTIFIED REGISTERED

## 2024-11-11 PROCEDURE — 63600175 PHARM REV CODE 636 W HCPCS: Performed by: NURSE ANESTHETIST, CERTIFIED REGISTERED

## 2024-11-11 PROCEDURE — A4216 STERILE WATER/SALINE, 10 ML: HCPCS | Performed by: NURSE ANESTHETIST, CERTIFIED REGISTERED

## 2024-11-11 PROCEDURE — 43239 EGD BIOPSY SINGLE/MULTIPLE: CPT | Performed by: STUDENT IN AN ORGANIZED HEALTH CARE EDUCATION/TRAINING PROGRAM

## 2024-11-11 PROCEDURE — 99900035 HC TECH TIME PER 15 MIN (STAT)

## 2024-11-11 PROCEDURE — 27201012 HC FORCEPS, HOT/COLD, DISP: Performed by: STUDENT IN AN ORGANIZED HEALTH CARE EDUCATION/TRAINING PROGRAM

## 2024-11-11 PROCEDURE — 37000008 HC ANESTHESIA 1ST 15 MINUTES: Performed by: STUDENT IN AN ORGANIZED HEALTH CARE EDUCATION/TRAINING PROGRAM

## 2024-11-11 RX ORDER — DEXMEDETOMIDINE HYDROCHLORIDE 100 UG/ML
INJECTION, SOLUTION INTRAVENOUS
Status: DISCONTINUED | OUTPATIENT
Start: 2024-11-11 | End: 2024-11-11

## 2024-11-11 RX ORDER — LIDOCAINE HYDROCHLORIDE 20 MG/ML
INJECTION INTRAVENOUS
Status: DISCONTINUED | OUTPATIENT
Start: 2024-11-11 | End: 2024-11-11

## 2024-11-11 RX ORDER — SODIUM CHLORIDE 0.9 % (FLUSH) 0.9 %
SYRINGE (ML) INJECTION
Status: DISCONTINUED | OUTPATIENT
Start: 2024-11-11 | End: 2024-11-11

## 2024-11-11 RX ORDER — TOPICAL ANESTHETIC 200 MG/ML
SPRAY DENTAL; PERIODONTAL
Status: DISCONTINUED | OUTPATIENT
Start: 2024-11-11 | End: 2024-11-11

## 2024-11-11 RX ORDER — PROPOFOL 10 MG/ML
VIAL (ML) INTRAVENOUS
Status: DISCONTINUED | OUTPATIENT
Start: 2024-11-11 | End: 2024-11-11

## 2024-11-11 RX ORDER — PROPOFOL 10 MG/ML
VIAL (ML) INTRAVENOUS CONTINUOUS PRN
Status: DISCONTINUED | OUTPATIENT
Start: 2024-11-11 | End: 2024-11-11

## 2024-11-11 RX ADMIN — TOPICAL ANESTHETIC 1 EACH: 200 SPRAY DENTAL; PERIODONTAL at 09:11

## 2024-11-11 RX ADMIN — LIDOCAINE HYDROCHLORIDE 50 MG: 20 INJECTION INTRAVENOUS at 09:11

## 2024-11-11 RX ADMIN — PROPOFOL 150 MCG/KG/MIN: 10 INJECTION, EMULSION INTRAVENOUS at 09:11

## 2024-11-11 RX ADMIN — PROPOFOL 100 MG: 10 INJECTION, EMULSION INTRAVENOUS at 09:11

## 2024-11-11 RX ADMIN — Medication 10 ML: at 09:11

## 2024-11-11 RX ADMIN — GLYCOPYRROLATE 0.1 MG: 0.2 INJECTION, SOLUTION INTRAMUSCULAR; INTRAVENOUS at 09:11

## 2024-11-11 RX ADMIN — DEXMEDETOMIDINE HYDROCHLORIDE 10 MCG: 100 INJECTION, SOLUTION INTRAVENOUS at 09:11

## 2024-11-11 NOTE — PROVATION PATIENT INSTRUCTIONS
Discharge Summary/Instructions after an Endoscopic Procedure  Patient Name: Eva Hair  Patient MRN: [id number]  Patient YOB: 1970  Monday, November 11, 2024  Emory Flores MD  Dear patient,  As a result of recent federal legislation (The Federal Cures Act), you may   receive lab or pathology results from your procedure in your [facility]   account before your physician is able to contact you. Your physician or   their representative will relay the results to you with their   recommendations at their soonest availability.  Thank you,  RESTRICTIONS:  During your procedure today, you received medications for sedation.  These   medications may affect your judgment, balance and coordination.  Therefore,   for 24 hours, you have the following restrictions:   - DO NOT drive a car, operate machinery, make legal/financial decisions,   sign important papers or drink alcohol.    ACTIVITY:  Today: no heavy lifting, straining or running due to procedural   sedation/anesthesia.  The following day: return to full activity including work.  DIET:  Eat and drink normally unless instructed otherwise.     TREATMENT FOR COMMON SIDE EFFECTS:  - Mild abdominal pain, nausea, belching, bloating or excessive gas:  rest,   eat lightly and use a heating pad.  - Sore Throat: treat with throat lozenges and/or gargle with warm salt   water.  - Because air was used during the procedure, expelling large amounts of air   from your rectum or belching is normal.  - If a bowel prep was taken, you may not have a bowel movement for 1-3 days.    This is normal.  SYMPTOMS TO WATCH FOR AND REPORT TO YOUR PHYSICIAN:  1. Abdominal pain or bloating, other than gas cramps.  2. Chest pain.  3. Back pain.  4. Signs of infection such as: chills or fever occurring within 24 hours   after the procedure.  5. Rectal bleeding, which would show as bright red, maroon, or black stools.   (A tablespoon of blood from the rectum is not serious, especially  if   hemorrhoids are present.)  6. Vomiting.  7. Weakness or dizziness.  GO DIRECTLY TO THE NEAREST EMERGENCY ROOM IF YOU HAVE ANY OF THE FOLLOWING:      Difficulty breathing              Chills and/or fever over 101 F   Persistent vomiting and/or vomiting blood   Severe abdominal pain   Severe chest pain   Black, tarry stools   Bleeding- more than one tablespoon   Any other symptom or condition that you feel may need urgent attention  Your doctor recommends these additional instructions:  If any biopsies were taken, your doctors clinic will contact you in 1 to 2   weeks with any results.  - Discharge patient to home.   - Await pathology results.   - Patient has a contact number available for emergencies.  The signs and   symptoms of potential delayed complications were discussed with the   patient.  Return to normal activities tomorrow.  Written discharge   instructions were provided to the patient.   - The findings and recommendations were discussed with the patient.   - Await pathology results.  For questions, problems or results please call your physician - Emory Flores MD at Work:  [phone].  [facility] [place], EMERGENCY ROOM PHONE NUMBER: [phone]  IF A COMPLICATION OR EMERGENCY SITUATION ARISES AND YOU ARE UNABLE TO REACH   YOUR PHYSICIAN - GO DIRECTLY TO THE EMERGENCY ROOM.  Emory Flores MD  11/11/2024 10:03:22 AM  This report has been verified and signed electronically.  Dear patient,  As a result of recent federal legislation (The Federal Cures Act), you may   receive lab or pathology results from your procedure in your [facility]   account before your physician is able to contact you. Your physician or   their representative will relay the results to you with their   recommendations at their soonest availability.  Thank you,  PROVATION

## 2024-11-11 NOTE — TRANSFER OF CARE
"Anesthesia Transfer of Care Note    Patient: Eva Hair    Procedure(s) Performed: Procedure(s) (LRB):  EGD (ESOPHAGOGASTRODUODENOSCOPY) (N/A)    Patient location: PACU    Anesthesia Type: general    Transport from OR: Transported from OR on 6-10 L/min O2 by face mask with adequate spontaneous ventilation    Post pain: adequate analgesia    Post assessment: no apparent anesthetic complications    Post vital signs: stable    Level of consciousness: sedated    Nausea/Vomiting: no nausea/vomiting    Complications: none    Transfer of care protocol was followed      Last vitals: Visit Vitals  BP (!) 151/89 (BP Location: Left arm, Patient Position: Lying)   Pulse 89   Temp 37 °C (98.6 °F) (Temporal)   Resp 16   Ht 5' 4" (1.626 m)   Wt 77.1 kg (170 lb)   SpO2 99%   Breastfeeding No   BMI 29.18 kg/m²     "

## 2024-11-11 NOTE — ANESTHESIA POSTPROCEDURE EVALUATION
Anesthesia Post Evaluation    Patient: Eva Hair    Procedure(s) Performed: Procedure(s) (LRB):  EGD (ESOPHAGOGASTRODUODENOSCOPY) (N/A)    Final Anesthesia Type: general      Patient location during evaluation: GI PACU  Patient participation: Yes- Able to Participate  Level of consciousness: awake and alert, oriented and awake  Post-procedure vital signs: reviewed and stable  Pain management: adequate  Airway patency: patent  DENISE mitigation strategies: Multimodal analgesia  PONV status at discharge: No PONV  Anesthetic complications: no      Cardiovascular status: blood pressure returned to baseline and hemodynamically stable  Respiratory status: unassisted and spontaneous ventilation  Hydration status: euvolemic  Follow-up not needed.              Vitals Value Taken Time   /62 11/11/24 1015   Temp 36.9 °C (98.5 °F) 11/11/24 1000   Pulse 86 11/11/24 1015   Resp 16 11/11/24 1015   SpO2 99 % 11/11/24 1015   Vitals shown include unfiled device data.      Event Time   Out of Recovery 10:15:00         Pain/Hernán Score: Hernán Score: 10 (11/11/2024 10:05 AM)

## 2024-11-11 NOTE — H&P
Short Stay Endoscopy History and Physical    PCP - Renetta Salcedo NP  Referring Physician - PRE-ADMIT, ENDO -[facility]  No address on file    Procedure - EGD  ASA - per anesthesia  Mallampati - per anesthesia  History of Anesthesia problems - no  Family history Anesthesia problems -  no   Plan of anesthesia - General    HPI  54 y.o. female  Reason for procedure:  Gastroesophageal reflux disease, unspecified whether esophagitis present [K21.9]  Dysphagia, unspecified type [R13.10]      ROS:  Constitutional: No fevers, chills, No weight loss  CV: No chest pain  Pulm: No cough, No shortness of breath  GI: see HPI    Medical History:  has a past medical history of Eczema (2018), Environmental and seasonal allergies (2018), and Mild persistent asthma without complication (2018).    Surgical History:  has a past surgical history that includes Hysterectomy;  section; Hemorrhoid surgery; Colonoscopy (N/A, 2023); and Sinus surgery.    Family History: family history includes Arthritis in her mother; Asthma in her brother; Breast cancer in her maternal aunt; Breast cancer (age of onset: 56) in her sister; Cerebral aneurysm (age of onset: 72) in her father; Diabetes in her maternal grandfather and maternal grandmother; Gout in her brother and mother; Hypertension in her brother and father; No Known Problems in her brother, brother, brother, daughter, paternal grandfather, paternal grandmother, sister, and son; Ovarian cancer in her maternal aunt..    Social History:  reports that she has never smoked. She has never used smokeless tobacco. She reports current alcohol use. She reports that she does not use drugs.    Review of patient's allergies indicates:   Allergen Reactions    Cat/feline products     Flexeril [cyclobenzaprine]      headache    Grass pollen-red top, standard     Iodine and iodide containing products Swelling    Milk containing products (dairy)      Whole Milk -  Headache    Oxycodone Other (See Comments)     Causes hallucinations    Pecan nut      Headache    Pork/porcine containing products Other (See Comments)     Headache       Medications:   Medications Prior to Admission   Medication Sig Dispense Refill Last Dose/Taking    albuterol (PROVENTIL/VENTOLIN HFA) 90 mcg/actuation inhaler Inhale 2 puffs into the lungs once daily. Rescue 18 g 3 Past Month    ibuprofen (ADVIL,MOTRIN) 800 MG tablet TAKE 1 TABLET BY MOUTH THREE TIMES A DAY 90 tablet 0 Past Month    loratadine (CLARITIN) 10 mg tablet Take 10 mg by mouth once daily.   Past Month    pantoprazole (PROTONIX) 40 MG tablet Take 1 tablet (40 mg total) by mouth once daily. 90 tablet 3 Past Month    azelastine (ASTELIN) 137 mcg (0.1 %) nasal spray 1 spray by Nasal route daily as needed.  4 More than a month    budesonide-formoterol 160-4.5 mcg (SYMBICORT) 160-4.5 mcg/actuation HFAA Inhale 2 puffs into the lungs once daily. Controller 10.2 g 5 More than a month    eszopiclone (LUNESTA) 2 MG Tab    More than a month    fluocinolone acetonide oiL (DERMOTIC OIL) 0.01 % Drop Place 3 drops in ear(s) 2 (two) times daily. 20 mL 2     fluticasone propionate (FLONASE) 50 mcg/actuation nasal spray APPLY 1 SPRAY INTO EACH NOSTRIL TWICE A DAY   More than a month    olopatadine (PATANOL) 0.1 % ophthalmic solution INSTILL 2 DROPS INTO BOTH EARS TWICE A DAY  4     triamcinolone acetonide 0.025% (KENALOG) 0.025 % cream Apply topically 2 (two) times daily. 1 each 0        Physical Exam:    Vital Signs: There were no vitals filed for this visit.    General Appearance: Well appearing in no acute distress  Abdomen: Soft, non tender, non distended with normal bowel sounds, no masses      Labs:  Lab Results   Component Value Date    WBC 6.26 07/26/2024    HGB 10.9 (L) 07/26/2024    HCT 33.7 (L) 07/26/2024     07/26/2024    CHOL 183 09/30/2024    TRIG 51 09/30/2024    HDL 58 09/30/2024    ALT 14 09/30/2024    AST 18 09/30/2024      09/30/2024    K 3.8 09/30/2024     09/30/2024    CREATININE 0.8 09/30/2024    BUN 15 09/30/2024    CO2 25 09/30/2024    TSH 0.375 (L) 07/26/2024    INR 1.0 09/15/2010    HGBA1C 5.1 09/30/2024       I have explained the risks and benefits of this endoscopic procedure to the patient including but not limited to bleeding, inflammation, infection, perforation, and death.      Emory Flores MD

## 2024-11-12 LAB
FINAL PATHOLOGIC DIAGNOSIS: NORMAL
GROSS: NORMAL
Lab: NORMAL

## 2024-11-19 ENCOUNTER — PATIENT MESSAGE (OUTPATIENT)
Dept: RESEARCH | Facility: HOSPITAL | Age: 54
End: 2024-11-19
Payer: COMMERCIAL

## 2024-12-12 ENCOUNTER — OFFICE VISIT (OUTPATIENT)
Dept: OPTOMETRY | Facility: CLINIC | Age: 54
End: 2024-12-12
Payer: COMMERCIAL

## 2024-12-12 DIAGNOSIS — H25.13 SENILE NUCLEAR SCLEROSIS, BILATERAL: ICD-10-CM

## 2024-12-12 DIAGNOSIS — H52.4 BILATERAL PRESBYOPIA: Primary | ICD-10-CM

## 2024-12-12 PROCEDURE — 92004 COMPRE OPH EXAM NEW PT 1/>: CPT | Mod: S$GLB,,, | Performed by: OPTOMETRIST

## 2024-12-12 PROCEDURE — 92015 DETERMINE REFRACTIVE STATE: CPT | Mod: S$GLB,,, | Performed by: OPTOMETRIST

## 2024-12-12 PROCEDURE — 99999 PR PBB SHADOW E&M-EST. PATIENT-LVL III: CPT | Mod: PBBFAC,,, | Performed by: OPTOMETRIST

## 2024-12-12 NOTE — PROGRESS NOTES
SANIYA    ALTAGRACIA: june 2023   Chief complaint (CC): patient here for annual exam and concerned about   cataract development   Glasses? +   Contacts? -  H/o eye surgery, injections or laser: -  H/o eye injury: -  Known eye conditions? -  Family h/o eye conditions? -  Eye gtts? -      (-) Flashes (-)  Floaters (-) Mucous   (-)  Tearing (-) Itching (-) Burning   (-) Headaches (-) Eye Pain/discomfort (-) Irritation   (-)  Redness (-) Double vision (-) Blurry vision    Diabetic? -  A1c? Hemoglobin A1C       Date                     Value               Ref Range             Status                09/30/2024               5.1                 4.0 - 5.6 %           Final                Last edited by Kati Clifton on 12/12/2024 10:34 AM.            Assessment /Plan     For exam results, see Encounter Report.    Bilateral presbyopia    Senile nuclear sclerosis, bilateral      SRx released to patient. Patient educated on lens options. Normal ocular health. RTC 1 year for routine exam.   Nuclear sclerotic cataract - not visually significant. Observe.

## 2025-03-21 ENCOUNTER — OFFICE VISIT (OUTPATIENT)
Dept: OBSTETRICS AND GYNECOLOGY | Facility: CLINIC | Age: 55
End: 2025-03-21
Payer: COMMERCIAL

## 2025-03-21 ENCOUNTER — LAB VISIT (OUTPATIENT)
Dept: LAB | Facility: HOSPITAL | Age: 55
End: 2025-03-21
Attending: OBSTETRICS & GYNECOLOGY
Payer: COMMERCIAL

## 2025-03-21 VITALS
DIASTOLIC BLOOD PRESSURE: 96 MMHG | BODY MASS INDEX: 29.17 KG/M2 | WEIGHT: 170.88 LBS | SYSTOLIC BLOOD PRESSURE: 134 MMHG | HEIGHT: 64 IN

## 2025-03-21 DIAGNOSIS — N89.8 VAGINAL ODOR: ICD-10-CM

## 2025-03-21 DIAGNOSIS — R35.0 URINARY FREQUENCY: Primary | ICD-10-CM

## 2025-03-21 DIAGNOSIS — Z11.3 SCREEN FOR STD (SEXUALLY TRANSMITTED DISEASE): ICD-10-CM

## 2025-03-21 LAB
BILIRUB UR QL STRIP: NEGATIVE
CLARITY UR REFRACT.AUTO: CLEAR
COLOR UR AUTO: YELLOW
GLUCOSE UR QL STRIP: NEGATIVE
HAV IGM SERPL QL IA: NORMAL
HBV CORE IGM SERPL QL IA: NORMAL
HBV SURFACE AG SERPL QL IA: NORMAL
HCV AB SERPL QL IA: NORMAL
HGB UR QL STRIP: NEGATIVE
HIV 1+2 AB+HIV1 P24 AG SERPL QL IA: NORMAL
KETONES UR QL STRIP: NEGATIVE
LEUKOCYTE ESTERASE UR QL STRIP: NEGATIVE
NITRITE UR QL STRIP: NEGATIVE
PH UR STRIP: 6 [PH] (ref 5–8)
PROT UR QL STRIP: NEGATIVE
SP GR UR STRIP: 1.02 (ref 1–1.03)
TREPONEMA PALLIDUM IGG+IGM AB [PRESENCE] IN SERUM OR PLASMA BY IMMUNOASSAY: NONREACTIVE
URN SPEC COLLECT METH UR: NORMAL

## 2025-03-21 PROCEDURE — 81515 NFCT DS BV&VAGINITIS DNA ALG: CPT | Performed by: OBSTETRICS & GYNECOLOGY

## 2025-03-21 PROCEDURE — 36415 COLL VENOUS BLD VENIPUNCTURE: CPT | Mod: PN | Performed by: OBSTETRICS & GYNECOLOGY

## 2025-03-21 PROCEDURE — 87591 N.GONORRHOEAE DNA AMP PROB: CPT | Performed by: OBSTETRICS & GYNECOLOGY

## 2025-03-21 PROCEDURE — 87086 URINE CULTURE/COLONY COUNT: CPT | Performed by: OBSTETRICS & GYNECOLOGY

## 2025-03-21 PROCEDURE — 87389 HIV-1 AG W/HIV-1&-2 AB AG IA: CPT | Performed by: OBSTETRICS & GYNECOLOGY

## 2025-03-21 PROCEDURE — 81003 URINALYSIS AUTO W/O SCOPE: CPT | Performed by: OBSTETRICS & GYNECOLOGY

## 2025-03-21 PROCEDURE — 99999 PR PBB SHADOW E&M-EST. PATIENT-LVL III: CPT | Mod: PBBFAC,,, | Performed by: OBSTETRICS & GYNECOLOGY

## 2025-03-21 PROCEDURE — 80074 ACUTE HEPATITIS PANEL: CPT | Performed by: OBSTETRICS & GYNECOLOGY

## 2025-03-21 PROCEDURE — 86593 SYPHILIS TEST NON-TREP QUANT: CPT | Performed by: OBSTETRICS & GYNECOLOGY

## 2025-03-21 RX ORDER — METRONIDAZOLE 500 MG/1
500 TABLET ORAL EVERY 12 HOURS
Qty: 14 TABLET | Refills: 0 | Status: SHIPPED | OUTPATIENT
Start: 2025-03-21 | End: 2025-03-28

## 2025-03-21 NOTE — PROGRESS NOTES
"History & Physical  Gynecology      SUBJECTIVE:     Chief Complaint: Urinary Tract Infection       History of Present Illness:  Eva Hair is a 54 y.o. female  her with complaints of urinary frequency for the past few weeks.  Pt also feels like she's having a vaginal odor.  Feels like her pH is off.  Not having discharge.  Having a little more perspiration in the area.  Has tried probiotics, decrease sugar intake, washing with warm water, usual things that help with the symptoms but has not seen improvement.  Has had BV in the past.  Has been negative the past couple swabs.  Pt is also having some urinary leakage.  Pt will say that it happens randomly, while she is walking.  Does not have urge to urinate before.  No pain with urination.  Frequency is happening both during the day and at night.    Review of Systems:  Review of Systems   Constitutional: Negative.  Negative for chills and fatigue.   HENT:  Negative for facial swelling.    Eyes:  Negative for visual disturbance.   Respiratory:  Negative for cough and shortness of breath.    Cardiovascular:  Negative for chest pain.   Gastrointestinal:  Negative for abdominal distention, abdominal pain, constipation, diarrhea and nausea.   Genitourinary:  Positive for frequency. Negative for dysuria, flank pain, genital sores, vaginal bleeding, vaginal discharge and vaginal pain.   Skin:  Negative for rash.   Neurological:  Negative for dizziness, weakness, light-headedness and headaches.   Psychiatric/Behavioral:  Negative for behavioral problems. The patient is not nervous/anxious.         OBJECTIVE:   Vitals:     Vitals:    25 1124   BP: (!) 134/96   Weight: 77.5 kg (170 lb 13.7 oz)   Height: 5' 4" (1.626 m)        Physical Exam:  Physical Exam  Constitutional:       Appearance: She is well-developed.   HENT:      Head: Normocephalic and atraumatic.   Eyes:      General: No scleral icterus.        Right eye: No discharge.         Left eye: No " discharge.      Conjunctiva/sclera: Conjunctivae normal.   Neck:      Thyroid: No thyromegaly.   Cardiovascular:      Rate and Rhythm: Normal rate.   Pulmonary:      Effort: Pulmonary effort is normal.      Breath sounds: No stridor.   Abdominal:      General: There is no distension.      Palpations: Abdomen is soft.      Tenderness: There is no abdominal tenderness.   Genitourinary:     Labia:         Right: No rash, tenderness, lesion or injury.         Left: No rash, tenderness, lesion or injury.       Vagina: Normal. No vaginal discharge or bleeding.      Cervix: No cervical motion tenderness, discharge or friability.      Uterus: Not enlarged and not tender.       Adnexa:         Right: No mass, tenderness or fullness.          Left: No mass, tenderness or fullness.     Musculoskeletal:         General: Normal range of motion.   Skin:     General: Skin is warm and dry.   Neurological:      Mental Status: She is alert and oriented to person, place, and time.   Psychiatric:         Behavior: Behavior normal.         Thought Content: Thought content normal.         Judgment: Judgment normal.           ASSESSMENT:       ICD-10-CM ICD-9-CM    1. Urinary frequency  R35.0 788.41 Urinalysis      Urine Culture High Risk      2. Vaginal odor  N89.8 625.8 Vaginosis Screen by DNA Probe      metroNIDAZOLE (FLAGYL) 500 MG tablet      3. Screen for STD (sexually transmitted disease)  Z11.3 V74.5 Vaginosis Screen by DNA Probe      C. trachomatis/N. gonorrhoeae by AMP DNA      HIV 1/2 Ag/Ab (4th Gen)      Treponema Pallidium Antibodies IgG, IgM      Hepatitis Panel, Acute                 Plan:      Eva was seen today for urinary tract infection.    Diagnoses and all orders for this visit:    Urinary frequency  - Discussed bladder irritants.  Also discussed grade 1 cystocele and pelvic PT  - Will look for UTI.  Shown bladder irritants sheet.  All questions answered.   -     Urinalysis  -     Urine Culture High  Risk    Vaginal odor  -     Vaginosis Screen by DNA Probe  -     metroNIDAZOLE (FLAGYL) 500 MG tablet; Take 1 tablet (500 mg total) by mouth every 12 (twelve) hours. for 7 days    Screen for STD (sexually transmitted disease)  -     Vaginosis Screen by DNA Probe  -     C. trachomatis/N. gonorrhoeae by AMP DNA  -     HIV 1/2 Ag/Ab (4th Gen); Future  -     Treponema Pallidium Antibodies IgG, IgM; Future  -     Hepatitis Panel, Acute; Future        Orders Placed This Encounter   Procedures    Urine Culture High Risk    Urinalysis    Vaginosis Screen by DNA Probe    C. trachomatis/N. gonorrhoeae by AMP DNA    HIV 1/2 Ag/Ab (4th Gen)    Treponema Pallidium Antibodies IgG, IgM    Hepatitis Panel, Acute       No follow-ups on file.      Face to Face time with patient: 30 min    35 minutes of total time spent on the encounter, which includes face to face time and non-face to face time preparing to see the patient (eg, review of tests), Obtaining and/or reviewing separately obtained history, Documenting clinical information in the electronic or other health record, Independently interpreting results (not separately reported) and communicating results to the patient/family/caregiver, or Care coordination (not separately reported).      Kiara Love MD  Obstetrics & Gynecology

## 2025-03-22 LAB
BACTERIAL VAGINOSIS DNA: NOT DETECTED
C TRACH DNA SPEC QL NAA+PROBE: NOT DETECTED
CANDIDA GLABRATA/KRUSEI: NOT DETECTED
CANDIDA RRNA VAG QL PROBE: NOT DETECTED
N GONORRHOEA DNA SPEC QL NAA+PROBE: NOT DETECTED
TRICHOMONAS VAGINALIS: NOT DETECTED

## 2025-04-14 ENCOUNTER — OFFICE VISIT (OUTPATIENT)
Dept: PRIMARY CARE CLINIC | Facility: CLINIC | Age: 55
End: 2025-04-14
Payer: COMMERCIAL

## 2025-04-14 VITALS
HEART RATE: 110 BPM | WEIGHT: 168 LBS | HEIGHT: 64 IN | OXYGEN SATURATION: 98 % | BODY MASS INDEX: 28.68 KG/M2 | SYSTOLIC BLOOD PRESSURE: 152 MMHG | TEMPERATURE: 99 F | DIASTOLIC BLOOD PRESSURE: 100 MMHG

## 2025-04-14 DIAGNOSIS — J32.9 SINUSITIS, UNSPECIFIED CHRONICITY, UNSPECIFIED LOCATION: ICD-10-CM

## 2025-04-14 DIAGNOSIS — J30.2 SEASONAL ALLERGIES: ICD-10-CM

## 2025-04-14 DIAGNOSIS — R05.9 COUGH, UNSPECIFIED TYPE: ICD-10-CM

## 2025-04-14 DIAGNOSIS — R09.81 NASAL SINUS CONGESTION: ICD-10-CM

## 2025-04-14 DIAGNOSIS — F33.1 MAJOR DEPRESSIVE DISORDER, RECURRENT, MODERATE: Primary | ICD-10-CM

## 2025-04-14 PROCEDURE — 1159F MED LIST DOCD IN RCRD: CPT | Mod: CPTII,S$GLB,, | Performed by: NURSE PRACTITIONER

## 2025-04-14 PROCEDURE — 99214 OFFICE O/P EST MOD 30 MIN: CPT | Mod: S$GLB,,, | Performed by: NURSE PRACTITIONER

## 2025-04-14 PROCEDURE — 3080F DIAST BP >= 90 MM HG: CPT | Mod: CPTII,S$GLB,, | Performed by: NURSE PRACTITIONER

## 2025-04-14 PROCEDURE — 1160F RVW MEDS BY RX/DR IN RCRD: CPT | Mod: CPTII,S$GLB,, | Performed by: NURSE PRACTITIONER

## 2025-04-14 PROCEDURE — 99999 PR PBB SHADOW E&M-EST. PATIENT-LVL V: CPT | Mod: PBBFAC,,, | Performed by: NURSE PRACTITIONER

## 2025-04-14 PROCEDURE — 3008F BODY MASS INDEX DOCD: CPT | Mod: CPTII,S$GLB,, | Performed by: NURSE PRACTITIONER

## 2025-04-14 PROCEDURE — 3077F SYST BP >= 140 MM HG: CPT | Mod: CPTII,S$GLB,, | Performed by: NURSE PRACTITIONER

## 2025-04-14 RX ORDER — CETIRIZINE HYDROCHLORIDE 10 MG/1
10 TABLET ORAL DAILY
Qty: 30 TABLET | Refills: 11 | Status: SHIPPED | OUTPATIENT
Start: 2025-04-14 | End: 2026-04-14

## 2025-04-14 RX ORDER — PREDNISONE 20 MG/1
20 TABLET ORAL DAILY
Qty: 5 TABLET | Refills: 0 | Status: SHIPPED | OUTPATIENT
Start: 2025-04-14

## 2025-04-14 RX ORDER — AZITHROMYCIN 250 MG/1
TABLET, FILM COATED ORAL
Qty: 6 TABLET | Refills: 0 | Status: SHIPPED | OUTPATIENT
Start: 2025-04-14 | End: 2025-04-19

## 2025-04-14 RX ORDER — AMLODIPINE BESYLATE 5 MG/1
5 TABLET ORAL DAILY
Qty: 90 TABLET | Refills: 3 | Status: SHIPPED | OUTPATIENT
Start: 2025-04-14 | End: 2026-04-14

## 2025-04-14 RX ORDER — PROMETHAZINE HYDROCHLORIDE AND DEXTROMETHORPHAN HYDROBROMIDE 6.25; 15 MG/5ML; MG/5ML
5 SYRUP ORAL EVERY 4 HOURS PRN
Qty: 118 ML | Refills: 0 | Status: SHIPPED | OUTPATIENT
Start: 2025-04-14 | End: 2025-04-24

## 2025-04-14 RX ORDER — DESVENLAFAXINE 100 MG/1
100 TABLET, EXTENDED RELEASE ORAL DAILY
COMMUNITY

## 2025-04-14 NOTE — PROGRESS NOTES
[facility] Note    Chief Complaint      Chief Complaint   Patient presents with    Sore Throat    form filled out    Chest Congestion    Sinusitis    Cough    Fatigue       History of Present Illness      Eva Hair is a 54 y.o. female who presents today for   Chief Complaint   Patient presents with    Sore Throat    form filled out    Chest Congestion    Sinusitis    Cough    Fatigue         History of Present Illness    CHIEF COMPLAINT:  Patient presents with congestion and cough for approximately two weeks, accompanied by high blood pressure and symptoms of depression.    HPI:  Patient reports congestion for about 2 weeks, describing it as the most severe since her nasal surgery. She has been coughing up green and brownish phlegm, with occasional pink or red blood tinge to it,  possibly due to excessive coughing. She reports feeling unwell with generalized body aches. She started taking loratadine about a week ago, approximately 4 days ago. She also reports difficulty getting out of bed and concentrating at work. She has been treated for depression for the past 5 years and is currently taking Desvenlafaxine (changed from Wellbutrin about a month ago on March 14th) prescribed by her therapist, Dr. Small. She sees her therapist every 3 months for follow-ups. She works in an administrative capacity helping homeless veterans, but reports significant stress at work due to leadership issues and threats of demotion, while excelling at her job.    She denies chest pain and any SOB.    MEDICAL HISTORY:  Patient has a history of hypertension, diagnosed within the past year. She has been treated for depression for the last 5 years. She also has seasonal allergies.      ROS:  General: -fever, -chills, -fatigue, -weight gain, -weight loss  Eyes: -vision changes, -redness, -discharge  ENT: -ear pain, +nasal congestion, -sore throat  Cardiovascular: -chest pain, -palpitations, -lower extremity  edema  Respiratory: -cough, -shortness of breath, +productive cough, +hemoptysis  Gastrointestinal: -abdominal pain, -nausea, -vomiting, -diarrhea, -constipation, -blood in stool  Genitourinary: -dysuria, -hematuria, -frequency  Musculoskeletal: -joint pain, -muscle pain, +body aches  Skin: -rash, -lesion  Neurological: -headache, -dizziness, -numbness, -tingling  Psychiatric: -anxiety, +depression, +sleep difficulty           Family History:  family history includes Arthritis in her mother; Asthma in her brother; Breast cancer in her maternal aunt; Breast cancer (age of onset: 56) in her sister; Cerebral aneurysm (age of onset: 72) in her father; Diabetes in her maternal grandfather and maternal grandmother; Gout in her brother and mother; Hypertension in her brother and father; No Known Problems in her brother, brother, brother, daughter, paternal grandfather, paternal grandmother, sister, and son; Ovarian cancer in her maternal aunt.   Family history was reviewed with patient.     Medications:  Encounter Medications[1]    Allergies:  Review of patient's allergies indicates:   Allergen Reactions    Cat/feline products     Flexeril [cyclobenzaprine]      headache    Grass pollen-red top, standard     Iodine and iodide containing products Swelling    Milk containing products (dairy)      Whole Milk - Headache    Oxycodone Other (See Comments)     Causes hallucinations    Pecan nut      Headache    Pork/porcine containing products Other (See Comments)     Headache       Health Maintenance:  Health Maintenance   Topic Date Due    Pneumococcal Vaccines (Age 50+) (1 of 1 - PCV) Never done    Influenza Vaccine (1) 09/01/2024    COVID-19 Vaccine (2 - 2024-25 season) 10/14/2025 (Originally 9/1/2024)    Mammogram  07/29/2025    Hemoglobin A1c (Diabetic Prevention Screening)  09/30/2027    Lipid Panel  09/30/2029    TETANUS VACCINE  11/13/2032    Colorectal Cancer Screening  03/06/2033    RSV Vaccine (Age 60+ and Pregnant  "patients) (1 - 1-dose 75+ series) 05/03/2046    Hepatitis C Screening  Completed    Shingles Vaccine  Completed    HIV Screening  Completed     Health Maintenance Topics with due status: Not Due       Topic Last Completion Date    TETANUS VACCINE 11/13/2022    Colorectal Cancer Screening 03/06/2023    Mammogram 07/29/2024    Hemoglobin A1c (Diabetic Prevention Screening) 09/30/2024    Lipid Panel 09/30/2024    RSV Vaccine (Age 60+ and Pregnant patients) Not Due       Physical Exam      Vital Signs  Temp: 98.6 °F (37 °C)  Pulse: 110  SpO2: 98 %  BP: (!) 152/100  BP Location: Right arm  Patient Position: Sitting  Pain Score:   7  Pain Loc: Throat  Height and Weight  Height: 5' 4" (162.6 cm)  Weight: 76.2 kg (167 lb 15.9 oz)  BSA (Calculated - sq m): 1.85 sq meters  BMI (Calculated): 28.8  Weight in (lb) to have BMI = 25: 145.3]    Physical Exam    Vitals: Blood pressure: 152/110. Hypertensive.  General: No acute distress. Well-developed. Well-nourished.  Eyes: EOMI. Sclerae anicteric.  HENT: Normocephalic. Atraumatic. Nares patent. Moist oral mucosa. Pharyngeal erythema.  Ears: Congested ears bilaterally.  Cardiovascular: Regular rate. Regular rhythm. No murmurs. No rubs. No gallops. Normal S1, S2.  Respiratory: Normal respiratory effort. Clear to auscultation bilaterally. No rales. No rhonchi. No wheezing.  Musculoskeletal: No  obvious deformity.  Extremities: No lower extremity edema.  Neurological: Alert & oriented x3. No slurred speech. Normal gait.  Psychiatric: Normal mood. Normal affect. Good insight. Good judgment.  Skin: Warm. Dry. No rash.            Assessment/Plan     Eva Hair is a 54 y.o.female with:    Assessment & Plan    R09.81 Nasal sinus congestion  J32.9 Sinusitis  R05.9 Cough  J30.2 Seasonal allergies  F33.1 Major depressive disorder  I10 Hypertension    IMPRESSION:  - Assessed congestion and cough for approximately 2 weeks, likely due to seasonal allergies and possible sinus infection.  - " Evaluated elevated BP readings, considering family history of HTN.  - Reviewed current depression treatment, noting recent medication change by psychiatrist.      NASAL CONGESTION:  - prednisone 20 mg daily x 5 days.    SINUSITIS:  - Zpack    COUGH:  - Promethazine DM 6.25 mg/15 mg, 5 ml by mouth every 4 hours as needed for cough.    SEASONAL ALLERGIES:  - Cetirizine 10 mg by mouth nightly.    MAJOR DEPRESSIVE DISORDER, RECURRENT, MODERATE:  - Monitored patient's depression symptoms, noting daily depression, difficulty getting out of bed, and concentration issues at work.  - Evaluated the effectiveness of the current medication, Desvenlafaxine (Pristiq) 100mg, which was recently changed from Wellbutrin about a month ago.  - Assessment indicates that the current medication is not effectively managing the patient's depression symptoms.  - Advised patient to schedule an appointment with therapist to update on current medication of Pritiq not working for her.    ESSENTIAL HYPERTENSION:  - Measured blood pressure at 152/100.  - Educated patient on the need for blood pressure management to protect organs, including kidneys and eyes.  - Prescribed amlodipine 1 tablet daily for blood pressure control.  - RTC in 2 weeks for f/u with HTN    MAJOR DEPRESSIVE DISORDER:  - Recommend follow-up with therapist about medication effectiveness.    FOLLOW-UP:  - Scheduled a follow-up visit in 2 weeks to reassess blood pressure and medication efficacy.          As above, continue current medications and maintain follow up with specialists.  Return to clinic as needed.    Greater than 50% of visit was spent face to face with patient.  All questions were answered to patient's satisfaction.          Karen L Spencer, NP-C [facility]      This note was generated with the assistance of ambient listening technology. Verbal consent was obtained by the patient and accompanying visitor(s) for the recording of patient appointment to  facilitate this note. I attest to having reviewed and edited the generated note for accuracy, though some syntax or spelling errors may persist. Please contact the author of this note for any clarification.                   [1]   Outpatient Encounter Medications as of 4/14/2025   Medication Sig Dispense Refill    albuterol (PROVENTIL/VENTOLIN HFA) 90 mcg/actuation inhaler Inhale 2 puffs into the lungs once daily. Rescue 18 g 3    azelastine (ASTELIN) 137 mcg (0.1 %) nasal spray 1 spray by Nasal route daily as needed.  4    budesonide-formoterol 160-4.5 mcg (SYMBICORT) 160-4.5 mcg/actuation HFAA Inhale 2 puffs into the lungs once daily. Controller 10.2 g 5    desvenlafaxine succinate (PRISTIQ) 100 MG Tb24 Take 100 mg by mouth once daily.      eszopiclone (LUNESTA) 2 MG Tab       fluocinolone acetonide oiL (DERMOTIC OIL) 0.01 % Drop Place 3 drops in ear(s) 2 (two) times daily. 20 mL 2    fluticasone propionate (FLONASE) 50 mcg/actuation nasal spray APPLY 1 SPRAY INTO EACH NOSTRIL TWICE A DAY      ibuprofen (ADVIL,MOTRIN) 800 MG tablet TAKE 1 TABLET BY MOUTH THREE TIMES A DAY 90 tablet 0    loratadine (CLARITIN) 10 mg tablet Take 10 mg by mouth once daily.      olopatadine (PATANOL) 0.1 % ophthalmic solution INSTILL 2 DROPS INTO BOTH EARS TWICE A DAY  4    pantoprazole (PROTONIX) 40 MG tablet Take 1 tablet (40 mg total) by mouth once daily. 90 tablet 3    triamcinolone acetonide 0.025% (KENALOG) 0.025 % cream Apply topically 2 (two) times daily. 1 each 0    amLODIPine (NORVASC) 5 MG tablet Take 1 tablet (5 mg total) by mouth once daily. 90 tablet 3    azithromycin (Z-GRACIE) 250 MG tablet Take 2 tablets (500 mg total) by mouth once daily for 1 day, THEN 1 tablet (250 mg total) once daily for 4 days. 6 tablet 0    cetirizine (ZYRTEC) 10 MG tablet Take 1 tablet (10 mg total) by mouth once daily. 30 tablet 11    predniSONE (DELTASONE) 20 MG tablet Take 1 tablet (20 mg total) by mouth once daily. 5 tablet 0     promethazine-dextromethorphan (PROMETHAZINE-DM) 6.25-15 mg/5 mL Syrp Take 5 mLs by mouth every 4 (four) hours as needed (cough). 118 mL 0     No facility-administered encounter medications on file as of 4/14/2025.

## 2025-04-28 ENCOUNTER — OFFICE VISIT (OUTPATIENT)
Dept: PRIMARY CARE CLINIC | Facility: CLINIC | Age: 55
End: 2025-04-28
Payer: COMMERCIAL

## 2025-04-28 VITALS
WEIGHT: 166.88 LBS | BODY MASS INDEX: 28.49 KG/M2 | HEIGHT: 64 IN | OXYGEN SATURATION: 95 % | HEART RATE: 94 BPM | DIASTOLIC BLOOD PRESSURE: 90 MMHG | SYSTOLIC BLOOD PRESSURE: 116 MMHG

## 2025-04-28 DIAGNOSIS — F33.1 MAJOR DEPRESSIVE DISORDER, RECURRENT, MODERATE: ICD-10-CM

## 2025-04-28 DIAGNOSIS — F41.9 ANXIETY: ICD-10-CM

## 2025-04-28 DIAGNOSIS — J02.9 SORE THROAT: ICD-10-CM

## 2025-04-28 DIAGNOSIS — R09.81 SINUS CONGESTION: Primary | ICD-10-CM

## 2025-04-28 DIAGNOSIS — L30.9 DERMATITIS: ICD-10-CM

## 2025-04-28 LAB
CTP QC/QA: YES
MOLECULAR STREP A: NEGATIVE

## 2025-04-28 PROCEDURE — 3074F SYST BP LT 130 MM HG: CPT | Mod: CPTII,S$GLB,, | Performed by: NURSE PRACTITIONER

## 2025-04-28 PROCEDURE — 87651 STREP A DNA AMP PROBE: CPT | Mod: QW,S$GLB,, | Performed by: NURSE PRACTITIONER

## 2025-04-28 PROCEDURE — 1159F MED LIST DOCD IN RCRD: CPT | Mod: CPTII,S$GLB,, | Performed by: NURSE PRACTITIONER

## 2025-04-28 PROCEDURE — 3080F DIAST BP >= 90 MM HG: CPT | Mod: CPTII,S$GLB,, | Performed by: NURSE PRACTITIONER

## 2025-04-28 PROCEDURE — 99999 PR PBB SHADOW E&M-EST. PATIENT-LVL V: CPT | Mod: PBBFAC,,, | Performed by: NURSE PRACTITIONER

## 2025-04-28 PROCEDURE — 1160F RVW MEDS BY RX/DR IN RCRD: CPT | Mod: CPTII,S$GLB,, | Performed by: NURSE PRACTITIONER

## 2025-04-28 PROCEDURE — 99214 OFFICE O/P EST MOD 30 MIN: CPT | Mod: S$GLB,,, | Performed by: NURSE PRACTITIONER

## 2025-04-28 PROCEDURE — 3008F BODY MASS INDEX DOCD: CPT | Mod: CPTII,S$GLB,, | Performed by: NURSE PRACTITIONER

## 2025-04-28 RX ORDER — HYDROXYZINE PAMOATE 50 MG/1
50-100 CAPSULE ORAL NIGHTLY PRN
COMMUNITY
Start: 2025-03-14

## 2025-04-28 RX ORDER — ESCITALOPRAM OXALATE 20 MG/1
20 TABLET ORAL DAILY
COMMUNITY

## 2025-04-28 RX ORDER — PREDNISONE 20 MG/1
20 TABLET ORAL DAILY
Qty: 5 TABLET | Refills: 0 | Status: SHIPPED | OUTPATIENT
Start: 2025-04-28

## 2025-04-28 RX ORDER — HYDROCORTISONE 25 MG/G
CREAM TOPICAL 2 TIMES DAILY
Qty: 1 EACH | Refills: 0 | Status: SHIPPED | OUTPATIENT
Start: 2025-04-28

## 2025-04-28 NOTE — PROGRESS NOTES
[facility] Note    Chief Complaint      Chief Complaint   Patient presents with    Follow-up     HTN       History of Present Illness      Eva Hair is a 54 y.o. female who presents today for   Chief Complaint   Patient presents with    Follow-up     HTN         CHIEF COMPLAINT:  Patient presents for a follow-up visit regarding hypertension management and to discuss ongoing respiratory symptoms and skin irritation.    HPI:  Patient reports ongoing congestion in the throat with improved overall condition. She expectorates small amounts of sputum but is no longer coughing. She describes a sensation of heaviness in the throat, attributed to post-nasal drip. Patient also reports skin irritation and pruritus, possibly related to recent gardening activities. She wore gloves and a mask while gardening but still had some allergic reactions.    Patient's blood pressure has significantly improved since starting medication. Previous readings were 152/100 and 134/96, with the current reading being 116/90. Patient is tolerating the amlodipine medication, though it may cause mild gastric irritation.    Patient discusses her mental health, mentioning ongoing depression and anxiety. She recently had a medication change by her psychiatrist, switching from desvenlafaxine to Lexapro. Patient reports some improvement in her depression symptoms.    MEDICAL HISTORY:  Patient has a history of hypertension, depression, anxiety, and seasonal allergies. Patient has recently received a flu vaccine.      ROS:  General: -fever, -chills, -fatigue, -weight gain, -weight loss  Eyes: -vision changes, -redness, -discharge  ENT: -ear pain, -nasal congestion, -sore throat, +sense of lump/mass in throat when swallowing, +post nasal drip  Cardiovascular: -chest pain, -palpitations, -lower extremity edema  Respiratory: -cough, -shortness of breath, +sputum production  Gastrointestinal: -abdominal pain, -nausea, -vomiting, -diarrhea,  -constipation, -blood in stool, +indigestion  Genitourinary: -dysuria, -hematuria, -frequency  Musculoskeletal: -joint pain, -muscle pain  Skin: +rash, -lesion, +itching  Neurological: -headache, -dizziness, -numbness, -tingling  Psychiatric: +anxiety, +depression, -sleep difficulty  Allergic: +allergic reactions    Follow-up               Family History:  family history includes Arthritis in her mother; Asthma in her brother; Breast cancer in her maternal aunt; Breast cancer (age of onset: 56) in her sister; Cerebral aneurysm (age of onset: 72) in her father; Diabetes in her maternal grandfather and maternal grandmother; Gout in her brother and mother; Hypertension in her brother and father; No Known Problems in her brother, brother, brother, daughter, paternal grandfather, paternal grandmother, sister, and son; Ovarian cancer in her maternal aunt.   Family history was reviewed with patient.     Medications:  Encounter Medications[1]    Allergies:  Review of patient's allergies indicates:   Allergen Reactions    Cat/feline products     Flexeril [cyclobenzaprine]      headache    Grass pollen-red top, standard     Iodine and iodide containing products Swelling    Milk containing products (dairy)      Whole Milk - Headache    Oxycodone Other (See Comments)     Causes hallucinations    Pecan nut      Headache    Pork/porcine containing products Other (See Comments)     Headache       Health Maintenance:  Health Maintenance   Topic Date Due    COVID-19 Vaccine (2 - 2024-25 season) 10/14/2025 (Originally 9/1/2024)    Pneumococcal Vaccines (Age 50+) (1 of 1 - PCV) 04/28/2026 (Originally 5/3/2021)    Mammogram  07/29/2025    Hemoglobin A1c (Diabetic Prevention Screening)  09/30/2027    Lipid Panel  09/30/2029    TETANUS VACCINE  11/13/2032    Colorectal Cancer Screening  03/06/2033    RSV Vaccine (Age 60+ and Pregnant patients) (1 - 1-dose 75+ series) 05/03/2046    Hepatitis C Screening  Completed    Shingles Vaccine   "Completed    HIV Screening  Completed    Influenza Vaccine  Discontinued     Health Maintenance Topics with due status: Not Due       Topic Last Completion Date    TETANUS VACCINE 11/13/2022    Colorectal Cancer Screening 03/06/2023    Mammogram 07/29/2024    Hemoglobin A1c (Diabetic Prevention Screening) 09/30/2024    Lipid Panel 09/30/2024    RSV Vaccine (Age 60+ and Pregnant patients) Not Due       Physical Exam      Vital Signs  Pulse: 94  SpO2: 95 %  BP: (!) 116/90  BP Location: Left arm  Patient Position: Sitting  Pain Score: 0-No pain  Height and Weight  Height: 5' 4" (162.6 cm)  Weight: 75.7 kg (166 lb 14.2 oz)  BSA (Calculated - sq m): 1.85 sq meters  BMI (Calculated): 28.6  Weight in (lb) to have BMI = 25: 145.3]    Physical Exam  Constitutional:       Appearance: Normal appearance. She is normal weight.   HENT:      Head: Normocephalic and atraumatic.      Nose: Congestion present.      Mouth/Throat:      Mouth: Mucous membranes are moist.      Pharynx: Oropharynx is clear.   Eyes:      Extraocular Movements: Extraocular movements intact.      Conjunctiva/sclera: Conjunctivae normal.      Pupils: Pupils are equal, round, and reactive to light.   Cardiovascular:      Rate and Rhythm: Normal rate and regular rhythm.      Pulses: Normal pulses.      Heart sounds: Normal heart sounds.   Pulmonary:      Effort: Pulmonary effort is normal.      Breath sounds: Normal breath sounds.   Musculoskeletal:         General: Normal range of motion.      Cervical back: Normal range of motion.   Skin:     General: Skin is warm and dry.      Capillary Refill: Capillary refill takes less than 2 seconds.   Neurological:      General: No focal deficit present.      Mental Status: She is alert and oriented to person, place, and time. Mental status is at baseline.   Psychiatric:         Mood and Affect: Mood normal.         Behavior: Behavior normal.         Thought Content: Thought content normal.         Judgment: Judgment " normal.            Assessment/Plan     Eva Hair is a 54 y.o.female with:    Sinus congestion  -     predniSONE (DELTASONE) 20 MG tablet; Take 1 tablet (20 mg total) by mouth once daily.  Dispense: 5 tablet; Refill: 0    Dermatitis  -     hydrocortisone 2.5 % cream; Apply topically 2 (two) times daily.  Dispense: 1 each; Refill: 0    Sore throat  -     Ambulatory referral/consult to ENT; Future; Expected date: 05/05/2025  -     POCT Strep A, Molecular  - re-evaluate for tonsilloliths  - patient unsure if she wants to have tonsils removed.    Major depressive disorder, recurrent, moderate  - being seen by psychiatry.  - Started taking escitalopram and stopped desvenlafaxine succinate on 04/28/25.    Anxiety  - being seen by psychiatry.  - Started taking escitalopram and stopped desvenlafaxine succinate on 04/28/25.  - continue hydroxyzine 50 mg nightly as needed.      As above, continue current medications and maintain follow up with specialists.  Return to clinic as needed.    Greater than 50% of visit was spent face to face with patient.  All questions were answered to patient's satisfaction.          Karen L Spencer, NP-C [facility]                     [1]   Outpatient Encounter Medications as of 4/28/2025   Medication Sig Dispense Refill    albuterol (PROVENTIL/VENTOLIN HFA) 90 mcg/actuation inhaler Inhale 2 puffs into the lungs once daily. Rescue 18 g 3    amLODIPine (NORVASC) 5 MG tablet Take 1 tablet (5 mg total) by mouth once daily. 90 tablet 3    azelastine (ASTELIN) 137 mcg (0.1 %) nasal spray 1 spray by Nasal route daily as needed.  4    budesonide-formoterol 160-4.5 mcg (SYMBICORT) 160-4.5 mcg/actuation HFAA Inhale 2 puffs into the lungs once daily. Controller 10.2 g 5    cetirizine (ZYRTEC) 10 MG tablet Take 1 tablet (10 mg total) by mouth once daily. 30 tablet 11    EScitalopram oxalate (LEXAPRO) 20 MG tablet Take 20 mg by mouth once daily.      eszopiclone (LUNESTA) 2 MG Tab        fluocinolone acetonide oiL (DERMOTIC OIL) 0.01 % Drop Place 3 drops in ear(s) 2 (two) times daily. 20 mL 2    fluticasone propionate (FLONASE) 50 mcg/actuation nasal spray APPLY 1 SPRAY INTO EACH NOSTRIL TWICE A DAY      hydrOXYzine pamoate (VISTARIL) 50 MG Cap Take  mg by mouth nightly as needed.      ibuprofen (ADVIL,MOTRIN) 800 MG tablet TAKE 1 TABLET BY MOUTH THREE TIMES A DAY 90 tablet 0    olopatadine (PATANOL) 0.1 % ophthalmic solution INSTILL 2 DROPS INTO BOTH EARS TWICE A DAY  4    pantoprazole (PROTONIX) 40 MG tablet Take 1 tablet (40 mg total) by mouth once daily. 90 tablet 3    triamcinolone acetonide 0.025% (KENALOG) 0.025 % cream Apply topically 2 (two) times daily. 1 each 0    hydrocortisone 2.5 % cream Apply topically 2 (two) times daily. 1 each 0    predniSONE (DELTASONE) 20 MG tablet Take 1 tablet (20 mg total) by mouth once daily. 5 tablet 0    [] promethazine-dextromethorphan (PROMETHAZINE-DM) 6.25-15 mg/5 mL Syrp Take 5 mLs by mouth every 4 (four) hours as needed (cough). 118 mL 0    [DISCONTINUED] azithromycin (Z-GRACIE) 250 MG tablet Take 2 tablets (500 mg total) by mouth once daily for 1 day, THEN 1 tablet (250 mg total) once daily for 4 days. 6 tablet 0    [DISCONTINUED] desvenlafaxine succinate (PRISTIQ) 100 MG Tb24 Take 100 mg by mouth once daily. (Patient not taking: Reported on 2025)      [DISCONTINUED] loratadine (CLARITIN) 10 mg tablet Take 10 mg by mouth once daily. (Patient not taking: Reported on 2025)      [DISCONTINUED] predniSONE (DELTASONE) 20 MG tablet Take 1 tablet (20 mg total) by mouth once daily. (Patient not taking: Reported on 2025) 5 tablet 0     No facility-administered encounter medications on file as of 2025.

## 2025-06-05 ENCOUNTER — OFFICE VISIT (OUTPATIENT)
Dept: OTOLARYNGOLOGY | Facility: CLINIC | Age: 55
End: 2025-06-05
Payer: COMMERCIAL

## 2025-06-05 VITALS
SYSTOLIC BLOOD PRESSURE: 138 MMHG | WEIGHT: 167.13 LBS | HEART RATE: 84 BPM | DIASTOLIC BLOOD PRESSURE: 71 MMHG | BODY MASS INDEX: 28.68 KG/M2

## 2025-06-05 DIAGNOSIS — R09.82 PND (POST-NASAL DRIP): ICD-10-CM

## 2025-06-05 DIAGNOSIS — K21.9 LARYNGOPHARYNGEAL REFLUX (LPR): ICD-10-CM

## 2025-06-05 DIAGNOSIS — G47.30 SLEEP-DISORDERED BREATHING: Primary | ICD-10-CM

## 2025-06-05 PROCEDURE — 3008F BODY MASS INDEX DOCD: CPT | Mod: CPTII,S$GLB,, | Performed by: STUDENT IN AN ORGANIZED HEALTH CARE EDUCATION/TRAINING PROGRAM

## 2025-06-05 PROCEDURE — 1159F MED LIST DOCD IN RCRD: CPT | Mod: CPTII,S$GLB,, | Performed by: STUDENT IN AN ORGANIZED HEALTH CARE EDUCATION/TRAINING PROGRAM

## 2025-06-05 PROCEDURE — 3078F DIAST BP <80 MM HG: CPT | Mod: CPTII,S$GLB,, | Performed by: STUDENT IN AN ORGANIZED HEALTH CARE EDUCATION/TRAINING PROGRAM

## 2025-06-05 PROCEDURE — 99214 OFFICE O/P EST MOD 30 MIN: CPT | Mod: S$GLB,,, | Performed by: STUDENT IN AN ORGANIZED HEALTH CARE EDUCATION/TRAINING PROGRAM

## 2025-06-05 PROCEDURE — 3075F SYST BP GE 130 - 139MM HG: CPT | Mod: CPTII,S$GLB,, | Performed by: STUDENT IN AN ORGANIZED HEALTH CARE EDUCATION/TRAINING PROGRAM

## 2025-06-17 ENCOUNTER — TELEPHONE (OUTPATIENT)
Dept: SLEEP MEDICINE | Facility: OTHER | Age: 55
End: 2025-06-17
Payer: COMMERCIAL

## 2025-06-18 ENCOUNTER — HOSPITAL ENCOUNTER (OUTPATIENT)
Dept: SLEEP MEDICINE | Facility: OTHER | Age: 55
Discharge: HOME | End: 2025-06-18
Attending: STUDENT IN AN ORGANIZED HEALTH CARE EDUCATION/TRAINING PROGRAM
Payer: COMMERCIAL

## 2025-06-18 DIAGNOSIS — G47.30 SLEEP-DISORDERED BREATHING: ICD-10-CM

## 2025-06-18 PROCEDURE — 95800 SLP STDY UNATTENDED: CPT

## 2025-06-19 PROBLEM — G47.30 SLEEP-DISORDERED BREATHING: Status: ACTIVE | Noted: 2025-06-19

## 2025-06-22 PROCEDURE — 95800 SLP STDY UNATTENDED: CPT | Mod: 26,,, | Performed by: INTERNAL MEDICINE

## 2025-06-23 NOTE — PROCEDURES
"HST completed and interpreted.    Report is available under media tab (NOTE: It is located in the bottom half of the media time in the section labeled "unknown date")  Consider referral to Sleep Medicine if a formal consult might be of benefit.  Please feel free to contact me if you would like me to expedite a referral or if you have any questions.  " Normal vision: sees adequately in most situations; can see medication labels, newsprint

## 2025-07-14 ENCOUNTER — RESULTS FOLLOW-UP (OUTPATIENT)
Dept: OTOLARYNGOLOGY | Facility: CLINIC | Age: 55
End: 2025-07-14
Payer: COMMERCIAL

## 2025-07-14 DIAGNOSIS — G47.33 OSA (OBSTRUCTIVE SLEEP APNEA): Primary | ICD-10-CM

## 2025-07-15 ENCOUNTER — PATIENT MESSAGE (OUTPATIENT)
Dept: OTOLARYNGOLOGY | Facility: CLINIC | Age: 55
End: 2025-07-15
Payer: COMMERCIAL

## 2025-07-29 ENCOUNTER — OFFICE VISIT (OUTPATIENT)
Dept: OBSTETRICS AND GYNECOLOGY | Facility: CLINIC | Age: 55
End: 2025-07-29
Payer: COMMERCIAL

## 2025-07-29 VITALS
HEIGHT: 64 IN | DIASTOLIC BLOOD PRESSURE: 71 MMHG | WEIGHT: 165.19 LBS | SYSTOLIC BLOOD PRESSURE: 102 MMHG | BODY MASS INDEX: 28.2 KG/M2 | HEART RATE: 80 BPM

## 2025-07-29 DIAGNOSIS — Z11.3 SCREENING EXAMINATION FOR STI: ICD-10-CM

## 2025-07-29 DIAGNOSIS — Z01.419 WELL WOMAN EXAM WITH ROUTINE GYNECOLOGICAL EXAM: Primary | ICD-10-CM

## 2025-07-29 DIAGNOSIS — Z12.31 SCREENING MAMMOGRAM FOR BREAST CANCER: ICD-10-CM

## 2025-07-29 DIAGNOSIS — N63.10 MASS OF RIGHT BREAST, UNSPECIFIED QUADRANT: ICD-10-CM

## 2025-07-29 PROCEDURE — 99396 PREV VISIT EST AGE 40-64: CPT | Mod: S$GLB,,, | Performed by: OBSTETRICS & GYNECOLOGY

## 2025-07-29 PROCEDURE — 3074F SYST BP LT 130 MM HG: CPT | Mod: CPTII,S$GLB,, | Performed by: OBSTETRICS & GYNECOLOGY

## 2025-07-29 PROCEDURE — 1159F MED LIST DOCD IN RCRD: CPT | Mod: CPTII,S$GLB,, | Performed by: OBSTETRICS & GYNECOLOGY

## 2025-07-29 PROCEDURE — 99999 PR PBB SHADOW E&M-EST. PATIENT-LVL IV: CPT | Mod: PBBFAC,,, | Performed by: OBSTETRICS & GYNECOLOGY

## 2025-07-29 PROCEDURE — 3078F DIAST BP <80 MM HG: CPT | Mod: CPTII,S$GLB,, | Performed by: OBSTETRICS & GYNECOLOGY

## 2025-07-29 PROCEDURE — 3008F BODY MASS INDEX DOCD: CPT | Mod: CPTII,S$GLB,, | Performed by: OBSTETRICS & GYNECOLOGY

## 2025-07-29 RX ORDER — MIRTAZAPINE 15 MG/1
15 TABLET, FILM COATED ORAL NIGHTLY
COMMUNITY
Start: 2025-07-24

## 2025-07-29 NOTE — PROGRESS NOTES
CC: Well woman exam    Eva Hair is a 55 y.o. female  presents for well woman exam.  LMP: No LMP recorded. Patient has had a hysterectomy..  With intact ovaries for AUB in .  Here for well woman exam. NO history of abnormal Paps. No issues, problems, or complaints.  Patient would like STI screening.  Asymptomatic.    OB History          2    Para   2    Term   2            AB        Living             SAB        IAB        Ectopic        Multiple        Live Births                   Gynecology   Past Medical History:   Diagnosis Date    Eczema 2018    Environmental and seasonal allergies 2018    Mild persistent asthma without complication 2018     Past Surgical History:   Procedure Laterality Date     SECTION      x 2    COLONOSCOPY N/A 2023    Procedure: COLONOSCOPY;  Surgeon: Emory Flores MD;  Location: [facility] ENDOSCOPY;  Service: Endoscopy;  Laterality: N/A;  instructions via portal - r/s-has peg prep-tb  pt needs a early Monday appt    ESOPHAGOGASTRODUODENOSCOPY N/A 2024    Procedure: EGD (ESOPHAGOGASTRODUODENOSCOPY);  Surgeon: Emory Flores MD;  Location: [facility] ENDOSCOPY;  Service: Endoscopy;  Laterality: N/A;  clinic pt ref by freddy bustos np; instr via portal; pt prefers monday appt bc she is off every other monday; AP  24- pc complete. DBM  11.8 precall complete; pt has instr; all ques answered; AP    HEMORRHOID SURGERY      HYSTERECTOMY      ovaries intact    SINUS SURGERY       Social History[1]  Family History   Problem Relation Name Age of Onset    Arthritis Mother      Gout Mother      Hypertension Father      Cerebral aneurysm Father  72    Breast cancer Sister Sylvia 56    No Known Problems Sister Jacqui         1/2 sister    Asthma Brother Wilder     Hypertension Brother Wilder     No Known Problems Brother Colt     Gout Brother Geronimo     No Known Problems Brother Andrew         1/2 brother    No Known Problems  "Brother Geronimo         1/2 brother    Diabetes Maternal Grandmother      Diabetes Maternal Grandfather      No Known Problems Paternal Grandmother      No Known Problems Paternal Grandfather      Ovarian cancer Maternal Aunt Onelia     Breast cancer Maternal Aunt Alexandria         dx in 50s    No Known Problems Daughter Reagan     No Known Problems Son Maria Isabel          /71 (Patient Position: Sitting)   Pulse 80   Ht 5' 4" (1.626 m)   Wt 74.9 kg (165 lb 3.2 oz)   BMI 28.36 kg/m²       ROS  Review of Systems   Constitutional: Negative.    Gastrointestinal: Negative.    Genitourinary: Negative.    Psychiatric/Behavioral: Negative.            PHYSICAL EXAM:  Physical Exam  Vitals reviewed.   Constitutional:       General: She is not in acute distress.     Appearance: Normal appearance. She is well-developed and well-groomed.   Chest:   Breasts:     Breasts are symmetrical.      Right: Normal. No inverted nipple, mass, nipple discharge, skin change or tenderness.      Left: Normal. No inverted nipple, mass, nipple discharge, skin change or tenderness.          Comments: 3cm smooth mass in R armpit possible lipoma  Abdominal:      General: Abdomen is flat.      Palpations: Abdomen is soft.      Tenderness: There is no abdominal tenderness. There is no guarding or rebound.   Genitourinary:     General: Normal vulva.      Exam position: Lithotomy position.      Labia:         Right: No rash, tenderness or lesion.         Left: No rash, tenderness or lesion.       Urethra: No prolapse, urethral pain, urethral swelling or urethral lesion.      Vagina: Normal.      Uterus: Absent.       Adnexa: Right adnexa normal and left adnexa normal.        Right: No mass, tenderness or fullness.          Left: No mass, tenderness or fullness.        Comments: Intact vaginal cuff  Lymphadenopathy:      Upper Body:      Right upper body: No axillary adenopathy.      Left upper body: No axillary adenopathy.   Neurological:      Mental " Status: She is alert.   Psychiatric:         Behavior: Behavior is cooperative.            Well woman exam with routine gynecological exam    Screening mammogram for breast cancer    Screening examination for STI  -     Vaginosis Screen by DNA Probe  -     C. trachomatis/N. gonorrhoeae by AMP DNA [facility]; Vagina  -     HIV 1/2 Ag/Ab (4th Gen); Future; Expected date: 07/29/2025  -     Hepatitis Panel, Acute; Future; Expected date: 07/29/2025  -     Treponema Pallidium Antibodies IgG, IgM; Future; Expected date: 07/29/2025        S/p hysterectomy  No history of abnormal Pap  Pap not indicated  Patient requested STI screening.  Asymptomatic  Pelvic exam WNL  SBE education completed  MMG ordered    Patient was counseled today on A.C.S. Pap guidelines and recommendations for yearly pelvic exams, mammograms and monthly self breast exams; to see her PCP for other health maintenance.     No follow-ups on file.         [1]   Social History  Socioeconomic History    Marital status: Single   Occupational History     Employer: [employer]   Tobacco Use    Smoking status: Never     Passive exposure: Never    Smokeless tobacco: Never   Substance and Sexual Activity    Alcohol use: Yes     Comment: 2x/year    Drug use: No    Sexual activity: Yes     Partners: Male   Social History Narrative    Health information management    Single; 2 children: 27yo, 19yo; lives with children and their father    GYN Kashif

## 2025-08-15 ENCOUNTER — HOSPITAL ENCOUNTER (OUTPATIENT)
Dept: RADIOLOGY | Facility: OTHER | Age: 55
Discharge: HOME | End: 2025-08-15
Attending: OBSTETRICS & GYNECOLOGY
Payer: COMMERCIAL

## 2025-08-15 ENCOUNTER — HOSPITAL ENCOUNTER (OUTPATIENT)
Dept: RADIOLOGY | Facility: OTHER | Age: 55
Discharge: HOME | End: 2025-08-15
Attending: NURSE PRACTITIONER
Payer: COMMERCIAL

## 2025-08-15 DIAGNOSIS — N63.0 LUMP OR MASS IN BREAST: ICD-10-CM

## 2025-08-15 DIAGNOSIS — N63.10 MASS OF RIGHT BREAST, UNSPECIFIED QUADRANT: ICD-10-CM

## 2025-08-15 PROCEDURE — 77062 BREAST TOMOSYNTHESIS BI: CPT | Mod: 26,,, | Performed by: RADIOLOGY

## 2025-08-15 PROCEDURE — 77066 DX MAMMO INCL CAD BI: CPT | Mod: 26,,, | Performed by: RADIOLOGY

## 2025-08-15 PROCEDURE — 76642 ULTRASOUND BREAST LIMITED: CPT | Mod: TC,RT

## 2025-08-15 PROCEDURE — 76642 ULTRASOUND BREAST LIMITED: CPT | Mod: 26,RT,, | Performed by: RADIOLOGY

## 2025-08-15 PROCEDURE — 77062 BREAST TOMOSYNTHESIS BI: CPT | Mod: TC
